# Patient Record
Sex: FEMALE | Race: WHITE | NOT HISPANIC OR LATINO
[De-identification: names, ages, dates, MRNs, and addresses within clinical notes are randomized per-mention and may not be internally consistent; named-entity substitution may affect disease eponyms.]

---

## 2017-02-16 ENCOUNTER — APPOINTMENT (OUTPATIENT)
Dept: RADIATION ONCOLOGY | Facility: CLINIC | Age: 62
End: 2017-02-16
Payer: MEDICARE

## 2017-02-16 VITALS
OXYGEN SATURATION: 99 % | HEART RATE: 94 BPM | RESPIRATION RATE: 18 BRPM | DIASTOLIC BLOOD PRESSURE: 79 MMHG | BODY MASS INDEX: 23.88 KG/M2 | SYSTOLIC BLOOD PRESSURE: 126 MMHG | WEIGHT: 152.45 LBS

## 2017-02-16 PROCEDURE — 99213 OFFICE O/P EST LOW 20 MIN: CPT | Mod: GC

## 2017-04-26 ENCOUNTER — RECORD ABSTRACTING (OUTPATIENT)
Age: 62
End: 2017-04-26

## 2017-04-27 ENCOUNTER — APPOINTMENT (OUTPATIENT)
Dept: GYNECOLOGIC ONCOLOGY | Facility: CLINIC | Age: 62
End: 2017-04-27

## 2017-04-27 ENCOUNTER — OTHER (OUTPATIENT)
Age: 62
End: 2017-04-27

## 2017-04-27 VITALS
HEIGHT: 67 IN | SYSTOLIC BLOOD PRESSURE: 122 MMHG | BODY MASS INDEX: 23.86 KG/M2 | DIASTOLIC BLOOD PRESSURE: 80 MMHG | WEIGHT: 152 LBS

## 2017-04-28 ENCOUNTER — RX RENEWAL (OUTPATIENT)
Age: 62
End: 2017-04-28

## 2017-05-02 ENCOUNTER — APPOINTMENT (OUTPATIENT)
Dept: CT IMAGING | Facility: IMAGING CENTER | Age: 62
End: 2017-05-02

## 2017-05-02 ENCOUNTER — OUTPATIENT (OUTPATIENT)
Dept: OUTPATIENT SERVICES | Facility: HOSPITAL | Age: 62
LOS: 1 days | End: 2017-05-02
Payer: MEDICARE

## 2017-05-02 ENCOUNTER — OTHER (OUTPATIENT)
Age: 62
End: 2017-05-02

## 2017-05-02 DIAGNOSIS — R10.30 LOWER ABDOMINAL PAIN, UNSPECIFIED: ICD-10-CM

## 2017-05-02 LAB — CYTOLOGY CVX/VAG DOC THIN PREP: NORMAL

## 2017-05-02 PROCEDURE — 82565 ASSAY OF CREATININE: CPT

## 2017-05-02 PROCEDURE — 74177 CT ABD & PELVIS W/CONTRAST: CPT

## 2017-05-05 ENCOUNTER — OTHER (OUTPATIENT)
Age: 62
End: 2017-05-05

## 2017-05-08 ENCOUNTER — APPOINTMENT (OUTPATIENT)
Dept: INTERNAL MEDICINE | Facility: CLINIC | Age: 62
End: 2017-05-08

## 2017-05-08 VITALS — SYSTOLIC BLOOD PRESSURE: 130 MMHG | DIASTOLIC BLOOD PRESSURE: 80 MMHG

## 2017-05-08 DIAGNOSIS — J06.9 ACUTE UPPER RESPIRATORY INFECTION, UNSPECIFIED: ICD-10-CM

## 2017-05-08 RX ORDER — FLUOXETINE HYDROCHLORIDE 10 MG/1
10 TABLET ORAL DAILY
Refills: 0 | Status: ACTIVE | COMMUNITY
Start: 2017-05-08

## 2017-05-09 LAB
ALBUMIN SERPL ELPH-MCNC: 4.5 G/DL
ALP BLD-CCNC: 83 U/L
ALT SERPL-CCNC: 11 U/L
ANION GAP SERPL CALC-SCNC: 24 MMOL/L
AST SERPL-CCNC: 15 U/L
BILIRUB SERPL-MCNC: 0.4 MG/DL
BUN SERPL-MCNC: 16 MG/DL
CALCIUM SERPL-MCNC: 10.2 MG/DL
CHLORIDE SERPL-SCNC: 95 MMOL/L
CO2 SERPL-SCNC: 18 MMOL/L
CREAT SERPL-MCNC: 0.79 MG/DL
GLUCOSE SERPL-MCNC: 82 MG/DL
POTASSIUM SERPL-SCNC: 4.5 MMOL/L
PROT SERPL-MCNC: 7.8 G/DL
SODIUM SERPL-SCNC: 137 MMOL/L

## 2017-05-15 ENCOUNTER — RX RENEWAL (OUTPATIENT)
Age: 62
End: 2017-05-15

## 2017-05-15 LAB
BASOPHILS # BLD AUTO: 0 K/UL
BASOPHILS NFR BLD AUTO: 0 %
EOSINOPHIL # BLD AUTO: 0 K/UL
EOSINOPHIL NFR BLD AUTO: 0 %
HCT VFR BLD CALC: 40.3 %
HGB BLD-MCNC: 13.3 G/DL
LYMPHOCYTES # BLD AUTO: 0.43 K/UL
LYMPHOCYTES NFR BLD AUTO: 4.4 %
MAN DIFF?: NORMAL
MCHC RBC-ENTMCNC: 28.7 PG
MCHC RBC-ENTMCNC: 33 GM/DL
MCV RBC AUTO: 87 FL
MONOCYTES # BLD AUTO: 0.86 K/UL
MONOCYTES NFR BLD AUTO: 8.8 %
NEUTROPHILS # BLD AUTO: 8.49 K/UL
NEUTROPHILS NFR BLD AUTO: 86.8 %
PLATELET # BLD AUTO: 503 K/UL
RBC # BLD: 4.63 M/UL
RBC # FLD: 13.3 %
WBC # FLD AUTO: 9.78 K/UL

## 2017-05-19 ENCOUNTER — RX RENEWAL (OUTPATIENT)
Age: 62
End: 2017-05-19

## 2017-05-26 ENCOUNTER — APPOINTMENT (OUTPATIENT)
Dept: INTERNAL MEDICINE | Facility: CLINIC | Age: 62
End: 2017-05-26

## 2017-05-26 VITALS — DIASTOLIC BLOOD PRESSURE: 80 MMHG | SYSTOLIC BLOOD PRESSURE: 120 MMHG

## 2017-06-23 ENCOUNTER — APPOINTMENT (OUTPATIENT)
Dept: INTERNAL MEDICINE | Facility: CLINIC | Age: 62
End: 2017-06-23

## 2017-06-23 VITALS — DIASTOLIC BLOOD PRESSURE: 80 MMHG | SYSTOLIC BLOOD PRESSURE: 140 MMHG

## 2017-08-04 ENCOUNTER — LABORATORY RESULT (OUTPATIENT)
Age: 62
End: 2017-08-04

## 2017-08-04 ENCOUNTER — NON-APPOINTMENT (OUTPATIENT)
Age: 62
End: 2017-08-04

## 2017-08-04 ENCOUNTER — APPOINTMENT (OUTPATIENT)
Dept: INTERNAL MEDICINE | Facility: CLINIC | Age: 62
End: 2017-08-04
Payer: MEDICARE

## 2017-08-04 VITALS — BODY MASS INDEX: 24.17 KG/M2 | WEIGHT: 154 LBS | HEIGHT: 67 IN

## 2017-08-04 VITALS — DIASTOLIC BLOOD PRESSURE: 80 MMHG | SYSTOLIC BLOOD PRESSURE: 120 MMHG

## 2017-08-04 PROCEDURE — 93000 ELECTROCARDIOGRAM COMPLETE: CPT

## 2017-08-04 PROCEDURE — 99214 OFFICE O/P EST MOD 30 MIN: CPT | Mod: 25

## 2017-08-04 PROCEDURE — 36415 COLL VENOUS BLD VENIPUNCTURE: CPT

## 2017-08-04 RX ORDER — CYCLOBENZAPRINE HYDROCHLORIDE 5 MG/1
5 TABLET, FILM COATED ORAL
Qty: 14 | Refills: 0 | Status: DISCONTINUED | COMMUNITY
Start: 2017-05-08 | End: 2017-08-04

## 2017-08-07 ENCOUNTER — RESULT REVIEW (OUTPATIENT)
Age: 62
End: 2017-08-07

## 2017-08-07 LAB
ALBUMIN SERPL ELPH-MCNC: 4.2 G/DL
ALP BLD-CCNC: 106 U/L
ALT SERPL-CCNC: 17 U/L
ANION GAP SERPL CALC-SCNC: 16 MMOL/L
AST SERPL-CCNC: 23 U/L
BASOPHILS # BLD AUTO: 0.02 K/UL
BASOPHILS NFR BLD AUTO: 0.3 %
BILIRUB SERPL-MCNC: 0.3 MG/DL
BUN SERPL-MCNC: 14 MG/DL
CALCIUM SERPL-MCNC: 10.1 MG/DL
CHLORIDE SERPL-SCNC: 100 MMOL/L
CO2 SERPL-SCNC: 21 MMOL/L
CREAT SERPL-MCNC: 0.71 MG/DL
EOSINOPHIL # BLD AUTO: 0.02 K/UL
EOSINOPHIL NFR BLD AUTO: 0.3 %
FOLATE SERPL-MCNC: 17.5 NG/ML
GLUCOSE SERPL-MCNC: 92 MG/DL
HBA1C MFR BLD HPLC: 5.2 %
HCT VFR BLD CALC: 40.2 %
HGB BLD-MCNC: 12.7 G/DL
IMM GRANULOCYTES NFR BLD AUTO: 0.1 %
LYMPHOCYTES # BLD AUTO: 0.41 K/UL
LYMPHOCYTES NFR BLD AUTO: 5.3 %
MAN DIFF?: NORMAL
MCHC RBC-ENTMCNC: 27.9 PG
MCHC RBC-ENTMCNC: 31.6 GM/DL
MCV RBC AUTO: 88.4 FL
MONOCYTES # BLD AUTO: 0.65 K/UL
MONOCYTES NFR BLD AUTO: 8.4 %
NEUTROPHILS # BLD AUTO: 6.65 K/UL
NEUTROPHILS NFR BLD AUTO: 85.6 %
PLATELET # BLD AUTO: 408 K/UL
POTASSIUM SERPL-SCNC: 4.7 MMOL/L
PROT SERPL-MCNC: 7.2 G/DL
RBC # BLD: 4.55 M/UL
RBC # FLD: 14.6 %
SODIUM SERPL-SCNC: 137 MMOL/L
TSH SERPL-ACNC: 2.51 UIU/ML
VIT B12 SERPL-MCNC: 296 PG/ML
WBC # FLD AUTO: 7.76 K/UL

## 2017-08-14 LAB
HOMOCYSTEINE LEVEL: 14.9 UMOL/L
METHYLMALONIC ACID LEVEL: 256 NMOL/L

## 2017-08-14 RX ORDER — CHLORHEXIDINE GLUCONATE 4 %
1000 LIQUID (ML) TOPICAL DAILY
Qty: 30 | Refills: 10 | Status: ACTIVE | COMMUNITY
Start: 2017-08-14

## 2017-08-17 ENCOUNTER — APPOINTMENT (OUTPATIENT)
Dept: RADIATION ONCOLOGY | Facility: CLINIC | Age: 62
End: 2017-08-17
Payer: MEDICARE

## 2017-08-17 VITALS
HEART RATE: 76 BPM | DIASTOLIC BLOOD PRESSURE: 90 MMHG | SYSTOLIC BLOOD PRESSURE: 142 MMHG | OXYGEN SATURATION: 96 % | HEIGHT: 67 IN | WEIGHT: 147.71 LBS | BODY MASS INDEX: 23.18 KG/M2 | RESPIRATION RATE: 18 BRPM

## 2017-08-17 PROCEDURE — 99214 OFFICE O/P EST MOD 30 MIN: CPT | Mod: GC

## 2017-08-25 ENCOUNTER — APPOINTMENT (OUTPATIENT)
Dept: INTERNAL MEDICINE | Facility: CLINIC | Age: 62
End: 2017-08-25
Payer: MEDICARE

## 2017-08-25 DIAGNOSIS — Z01.818 ENCOUNTER FOR OTHER PREPROCEDURAL EXAMINATION: ICD-10-CM

## 2017-08-25 PROCEDURE — 99214 OFFICE O/P EST MOD 30 MIN: CPT

## 2017-08-26 ENCOUNTER — APPOINTMENT (OUTPATIENT)
Dept: INTERNAL MEDICINE | Facility: CLINIC | Age: 62
End: 2017-08-26
Payer: MEDICARE

## 2017-08-26 PROCEDURE — 93224 XTRNL ECG REC UP TO 48 HRS: CPT

## 2017-08-29 VITALS — DIASTOLIC BLOOD PRESSURE: 80 MMHG | SYSTOLIC BLOOD PRESSURE: 120 MMHG

## 2017-09-08 ENCOUNTER — APPOINTMENT (OUTPATIENT)
Dept: NEUROLOGY | Facility: CLINIC | Age: 62
End: 2017-09-08
Payer: MEDICARE

## 2017-09-08 VITALS
SYSTOLIC BLOOD PRESSURE: 146 MMHG | HEART RATE: 115 BPM | BODY MASS INDEX: 23.07 KG/M2 | HEIGHT: 67 IN | DIASTOLIC BLOOD PRESSURE: 82 MMHG | WEIGHT: 147 LBS

## 2017-09-08 PROCEDURE — 99204 OFFICE O/P NEW MOD 45 MIN: CPT

## 2017-09-17 ENCOUNTER — FORM ENCOUNTER (OUTPATIENT)
Age: 62
End: 2017-09-17

## 2017-09-18 ENCOUNTER — OUTPATIENT (OUTPATIENT)
Dept: OUTPATIENT SERVICES | Facility: HOSPITAL | Age: 62
LOS: 1 days | End: 2017-09-18
Payer: MEDICARE

## 2017-09-18 ENCOUNTER — APPOINTMENT (OUTPATIENT)
Dept: MRI IMAGING | Facility: CLINIC | Age: 62
End: 2017-09-18
Payer: MEDICARE

## 2017-09-18 DIAGNOSIS — Z00.8 ENCOUNTER FOR OTHER GENERAL EXAMINATION: ICD-10-CM

## 2017-09-18 PROCEDURE — 72148 MRI LUMBAR SPINE W/O DYE: CPT | Mod: 26

## 2017-09-18 PROCEDURE — 72148 MRI LUMBAR SPINE W/O DYE: CPT

## 2017-09-20 ENCOUNTER — MEDICATION RENEWAL (OUTPATIENT)
Age: 62
End: 2017-09-20

## 2017-10-11 ENCOUNTER — APPOINTMENT (OUTPATIENT)
Dept: INTERNAL MEDICINE | Facility: CLINIC | Age: 62
End: 2017-10-11
Payer: MEDICARE

## 2017-10-11 VITALS — SYSTOLIC BLOOD PRESSURE: 120 MMHG | DIASTOLIC BLOOD PRESSURE: 80 MMHG

## 2017-10-11 DIAGNOSIS — Z98.890 OTHER SPECIFIED POSTPROCEDURAL STATES: ICD-10-CM

## 2017-10-11 PROCEDURE — G0008: CPT

## 2017-10-11 PROCEDURE — 99214 OFFICE O/P EST MOD 30 MIN: CPT | Mod: 25

## 2017-10-11 PROCEDURE — 90686 IIV4 VACC NO PRSV 0.5 ML IM: CPT

## 2017-10-11 RX ORDER — MELOXICAM 15 MG/1
15 TABLET ORAL DAILY
Qty: 30 | Refills: 2 | Status: DISCONTINUED | COMMUNITY
Start: 2017-09-08 | End: 2017-10-11

## 2017-10-11 RX ORDER — NAPROXEN 500 MG/1
500 TABLET ORAL TWICE DAILY
Qty: 20 | Refills: 0 | Status: DISCONTINUED | COMMUNITY
Start: 2017-05-08 | End: 2017-10-11

## 2017-10-16 ENCOUNTER — INPATIENT (INPATIENT)
Facility: HOSPITAL | Age: 62
LOS: 6 days | Discharge: HOME | DRG: 470 | End: 2017-10-23
Attending: ORTHOPAEDIC SURGERY | Admitting: ORTHOPAEDIC SURGERY
Payer: MEDICARE

## 2017-10-16 VITALS
TEMPERATURE: 98 F | RESPIRATION RATE: 18 BRPM | OXYGEN SATURATION: 98 % | SYSTOLIC BLOOD PRESSURE: 134 MMHG | HEART RATE: 88 BPM | WEIGHT: 143.08 LBS | DIASTOLIC BLOOD PRESSURE: 79 MMHG

## 2017-10-16 DIAGNOSIS — K21.9 GASTRO-ESOPHAGEAL REFLUX DISEASE WITHOUT ESOPHAGITIS: ICD-10-CM

## 2017-10-16 DIAGNOSIS — K57.92 DIVERTICULITIS OF INTESTINE, PART UNSPECIFIED, WITHOUT PERFORATION OR ABSCESS WITHOUT BLEEDING: ICD-10-CM

## 2017-10-16 DIAGNOSIS — S72.001A FRACTURE OF UNSPECIFIED PART OF NECK OF RIGHT FEMUR, INITIAL ENCOUNTER FOR CLOSED FRACTURE: ICD-10-CM

## 2017-10-16 DIAGNOSIS — C55 MALIGNANT NEOPLASM OF UTERUS, PART UNSPECIFIED: ICD-10-CM

## 2017-10-16 DIAGNOSIS — F41.8 OTHER SPECIFIED ANXIETY DISORDERS: ICD-10-CM

## 2017-10-16 LAB
ALBUMIN SERPL ELPH-MCNC: 4.2 G/DL — SIGNIFICANT CHANGE UP (ref 3.3–5)
ALP SERPL-CCNC: 89 U/L — SIGNIFICANT CHANGE UP (ref 40–120)
ALT FLD-CCNC: 11 U/L RC — SIGNIFICANT CHANGE UP (ref 10–45)
ANION GAP SERPL CALC-SCNC: 15 MMOL/L — SIGNIFICANT CHANGE UP (ref 5–17)
APPEARANCE UR: CLEAR — SIGNIFICANT CHANGE UP
APTT BLD: 28.3 SEC — SIGNIFICANT CHANGE UP (ref 27.5–37.4)
AST SERPL-CCNC: 15 U/L — SIGNIFICANT CHANGE UP (ref 10–40)
BASOPHILS # BLD AUTO: 0 K/UL — SIGNIFICANT CHANGE UP (ref 0–0.2)
BASOPHILS NFR BLD AUTO: 0.5 % — SIGNIFICANT CHANGE UP (ref 0–2)
BILIRUB SERPL-MCNC: 0.5 MG/DL — SIGNIFICANT CHANGE UP (ref 0.2–1.2)
BILIRUB UR-MCNC: NEGATIVE — SIGNIFICANT CHANGE UP
BLD GP AB SCN SERPL QL: NEGATIVE — SIGNIFICANT CHANGE UP
BUN SERPL-MCNC: 15 MG/DL — SIGNIFICANT CHANGE UP (ref 7–23)
CALCIUM SERPL-MCNC: 9.8 MG/DL — SIGNIFICANT CHANGE UP (ref 8.4–10.5)
CHLORIDE SERPL-SCNC: 97 MMOL/L — SIGNIFICANT CHANGE UP (ref 96–108)
CO2 SERPL-SCNC: 24 MMOL/L — SIGNIFICANT CHANGE UP (ref 22–31)
COLOR SPEC: SIGNIFICANT CHANGE UP
CREAT SERPL-MCNC: 0.75 MG/DL — SIGNIFICANT CHANGE UP (ref 0.5–1.3)
CRP SERPL-MCNC: 0.6 MG/DL — HIGH (ref 0–0.4)
DIFF PNL FLD: NEGATIVE — SIGNIFICANT CHANGE UP
EOSINOPHIL # BLD AUTO: 0 K/UL — SIGNIFICANT CHANGE UP (ref 0–0.5)
EOSINOPHIL NFR BLD AUTO: 0.3 % — SIGNIFICANT CHANGE UP (ref 0–6)
ERYTHROCYTE [SEDIMENTATION RATE] IN BLOOD: 19 MM/HR — SIGNIFICANT CHANGE UP (ref 0–20)
GLUCOSE SERPL-MCNC: 104 MG/DL — HIGH (ref 70–99)
GLUCOSE UR QL: NEGATIVE — SIGNIFICANT CHANGE UP
HCT VFR BLD CALC: 38.2 % — SIGNIFICANT CHANGE UP (ref 34.5–45)
HGB BLD-MCNC: 13.3 G/DL — SIGNIFICANT CHANGE UP (ref 11.5–15.5)
INR BLD: 1.01 RATIO — SIGNIFICANT CHANGE UP (ref 0.88–1.16)
KETONES UR-MCNC: ABNORMAL
LDH SERPL L TO P-CCNC: 200 U/L — SIGNIFICANT CHANGE UP (ref 50–242)
LEUKOCYTE ESTERASE UR-ACNC: NEGATIVE — SIGNIFICANT CHANGE UP
LYMPHOCYTES # BLD AUTO: 0.6 K/UL — LOW (ref 1–3.3)
LYMPHOCYTES # BLD AUTO: 6.4 % — LOW (ref 13–44)
MCHC RBC-ENTMCNC: 30.8 PG — SIGNIFICANT CHANGE UP (ref 27–34)
MCHC RBC-ENTMCNC: 34.8 GM/DL — SIGNIFICANT CHANGE UP (ref 32–36)
MCV RBC AUTO: 88.3 FL — SIGNIFICANT CHANGE UP (ref 80–100)
MONOCYTES # BLD AUTO: 0.7 K/UL — SIGNIFICANT CHANGE UP (ref 0–0.9)
MONOCYTES NFR BLD AUTO: 8.3 % — SIGNIFICANT CHANGE UP (ref 2–14)
NEUTROPHILS # BLD AUTO: 7.6 K/UL — HIGH (ref 1.8–7.4)
NEUTROPHILS NFR BLD AUTO: 84.5 % — HIGH (ref 43–77)
NITRITE UR-MCNC: NEGATIVE — SIGNIFICANT CHANGE UP
PH UR: 7 — SIGNIFICANT CHANGE UP (ref 5–8)
PLATELET # BLD AUTO: 384 K/UL — SIGNIFICANT CHANGE UP (ref 150–400)
POTASSIUM SERPL-MCNC: 3.9 MMOL/L — SIGNIFICANT CHANGE UP (ref 3.5–5.3)
POTASSIUM SERPL-SCNC: 3.9 MMOL/L — SIGNIFICANT CHANGE UP (ref 3.5–5.3)
PROT SERPL-MCNC: 6.4 G/DL — SIGNIFICANT CHANGE UP (ref 6–8.3)
PROT SERPL-MCNC: 6.4 G/DL — SIGNIFICANT CHANGE UP (ref 6–8.3)
PROT SERPL-MCNC: 6.8 G/DL — SIGNIFICANT CHANGE UP (ref 6–8.3)
PROT UR-MCNC: NEGATIVE — SIGNIFICANT CHANGE UP
PROTHROM AB SERPL-ACNC: 11 SEC — SIGNIFICANT CHANGE UP (ref 9.8–12.7)
RBC # BLD: 4.33 M/UL — SIGNIFICANT CHANGE UP (ref 3.8–5.2)
RBC # FLD: 12.2 % — SIGNIFICANT CHANGE UP (ref 10.3–14.5)
RH IG SCN BLD-IMP: NEGATIVE — SIGNIFICANT CHANGE UP
SODIUM SERPL-SCNC: 136 MMOL/L — SIGNIFICANT CHANGE UP (ref 135–145)
SP GR SPEC: 1.01 — LOW (ref 1.01–1.02)
UROBILINOGEN FLD QL: NEGATIVE — SIGNIFICANT CHANGE UP
WBC # BLD: 9 K/UL — SIGNIFICANT CHANGE UP (ref 3.8–10.5)
WBC # FLD AUTO: 9 K/UL — SIGNIFICANT CHANGE UP (ref 3.8–10.5)

## 2017-10-16 PROCEDURE — 99285 EMERGENCY DEPT VISIT HI MDM: CPT

## 2017-10-16 PROCEDURE — 93010 ELECTROCARDIOGRAM REPORT: CPT

## 2017-10-16 PROCEDURE — 73552 X-RAY EXAM OF FEMUR 2/>: CPT | Mod: 26,RT

## 2017-10-16 PROCEDURE — 73502 X-RAY EXAM HIP UNI 2-3 VIEWS: CPT | Mod: 26,RT

## 2017-10-16 PROCEDURE — 71010: CPT | Mod: 26

## 2017-10-16 PROCEDURE — 71260 CT THORAX DX C+: CPT | Mod: 26

## 2017-10-16 PROCEDURE — 74177 CT ABD & PELVIS W/CONTRAST: CPT | Mod: 26

## 2017-10-16 PROCEDURE — 72195 MRI PELVIS W/O DYE: CPT | Mod: 26

## 2017-10-16 RX ORDER — GABAPENTIN 400 MG/1
1 CAPSULE ORAL
Qty: 0 | Refills: 0 | COMMUNITY

## 2017-10-16 RX ORDER — ACETAMINOPHEN 500 MG
1000 TABLET ORAL ONCE
Qty: 0 | Refills: 0 | Status: COMPLETED | OUTPATIENT
Start: 2017-10-16 | End: 2017-10-16

## 2017-10-16 RX ORDER — HYDROMORPHONE HYDROCHLORIDE 2 MG/ML
0.5 INJECTION INTRAMUSCULAR; INTRAVENOUS; SUBCUTANEOUS EVERY 4 HOURS
Qty: 0 | Refills: 0 | Status: DISCONTINUED | OUTPATIENT
Start: 2017-10-16 | End: 2017-10-19

## 2017-10-16 RX ORDER — GABAPENTIN 400 MG/1
300 CAPSULE ORAL THREE TIMES A DAY
Qty: 0 | Refills: 0 | Status: DISCONTINUED | OUTPATIENT
Start: 2017-10-16 | End: 2017-10-19

## 2017-10-16 RX ORDER — ACETAMINOPHEN 500 MG
650 TABLET ORAL EVERY 6 HOURS
Qty: 0 | Refills: 0 | Status: DISCONTINUED | OUTPATIENT
Start: 2017-10-16 | End: 2017-10-19

## 2017-10-16 RX ORDER — OXYCODONE HYDROCHLORIDE 5 MG/1
5 TABLET ORAL EVERY 4 HOURS
Qty: 0 | Refills: 0 | Status: DISCONTINUED | OUTPATIENT
Start: 2017-10-16 | End: 2017-10-19

## 2017-10-16 RX ORDER — OXYCODONE HYDROCHLORIDE 5 MG/1
5 TABLET ORAL ONCE
Qty: 0 | Refills: 0 | Status: DISCONTINUED | OUTPATIENT
Start: 2017-10-16 | End: 2017-10-16

## 2017-10-16 RX ORDER — THIOTHIXENE 5 MG
5 CAPSULE ORAL
Qty: 0 | Refills: 0 | Status: DISCONTINUED | OUTPATIENT
Start: 2017-10-16 | End: 2017-10-19

## 2017-10-16 RX ORDER — DIPHENHYDRAMINE HCL 50 MG
25 CAPSULE ORAL AT BEDTIME
Qty: 0 | Refills: 0 | Status: DISCONTINUED | OUTPATIENT
Start: 2017-10-16 | End: 2017-10-19

## 2017-10-16 RX ORDER — CHOLECALCIFEROL (VITAMIN D3) 125 MCG
0 CAPSULE ORAL
Qty: 0 | Refills: 0 | COMMUNITY

## 2017-10-16 RX ORDER — ACETAMINOPHEN 500 MG
325 TABLET ORAL EVERY 4 HOURS
Qty: 0 | Refills: 0 | Status: DISCONTINUED | OUTPATIENT
Start: 2017-10-16 | End: 2017-10-19

## 2017-10-16 RX ORDER — OXYCODONE HYDROCHLORIDE 5 MG/1
10 TABLET ORAL EVERY 4 HOURS
Qty: 0 | Refills: 0 | Status: DISCONTINUED | OUTPATIENT
Start: 2017-10-16 | End: 2017-10-19

## 2017-10-16 RX ORDER — ACETAMINOPHEN 500 MG
975 TABLET ORAL ONCE
Qty: 0 | Refills: 0 | Status: COMPLETED | OUTPATIENT
Start: 2017-10-16 | End: 2017-10-16

## 2017-10-16 RX ORDER — THIOTHIXENE 5 MG
20 CAPSULE ORAL AT BEDTIME
Qty: 0 | Refills: 0 | Status: DISCONTINUED | OUTPATIENT
Start: 2017-10-16 | End: 2017-10-19

## 2017-10-16 RX ORDER — FAMOTIDINE 10 MG/ML
1 INJECTION INTRAVENOUS
Qty: 0 | Refills: 0 | COMMUNITY

## 2017-10-16 RX ORDER — HEPARIN SODIUM 5000 [USP'U]/ML
5000 INJECTION INTRAVENOUS; SUBCUTANEOUS EVERY 8 HOURS
Qty: 0 | Refills: 0 | Status: COMPLETED | OUTPATIENT
Start: 2017-10-16 | End: 2017-10-18

## 2017-10-16 RX ORDER — ASCORBIC ACID 60 MG
0 TABLET,CHEWABLE ORAL
Qty: 0 | Refills: 0 | COMMUNITY

## 2017-10-16 RX ORDER — FAMOTIDINE 10 MG/ML
20 INJECTION INTRAVENOUS EVERY 12 HOURS
Qty: 0 | Refills: 0 | Status: DISCONTINUED | OUTPATIENT
Start: 2017-10-16 | End: 2017-10-19

## 2017-10-16 RX ORDER — FLUOXETINE HCL 10 MG
20 CAPSULE ORAL DAILY
Qty: 0 | Refills: 0 | Status: DISCONTINUED | OUTPATIENT
Start: 2017-10-16 | End: 2017-10-19

## 2017-10-16 RX ORDER — HYDROMORPHONE HYDROCHLORIDE 2 MG/ML
1 INJECTION INTRAMUSCULAR; INTRAVENOUS; SUBCUTANEOUS ONCE
Qty: 0 | Refills: 0 | Status: COMPLETED | OUTPATIENT
Start: 2017-10-16 | End: 2017-10-23

## 2017-10-16 RX ORDER — TRIHEXYPHENIDYL HCL 2 MG
2 TABLET ORAL
Qty: 0 | Refills: 0 | Status: DISCONTINUED | OUTPATIENT
Start: 2017-10-16 | End: 2017-10-19

## 2017-10-16 RX ADMIN — OXYCODONE HYDROCHLORIDE 10 MILLIGRAM(S): 5 TABLET ORAL at 20:21

## 2017-10-16 RX ADMIN — OXYCODONE HYDROCHLORIDE 5 MILLIGRAM(S): 5 TABLET ORAL at 14:30

## 2017-10-16 RX ADMIN — HEPARIN SODIUM 5000 UNIT(S): 5000 INJECTION INTRAVENOUS; SUBCUTANEOUS at 23:13

## 2017-10-16 RX ADMIN — Medication 975 MILLIGRAM(S): at 14:30

## 2017-10-16 RX ADMIN — Medication 2 MILLIGRAM(S): at 23:13

## 2017-10-16 RX ADMIN — OXYCODONE HYDROCHLORIDE 10 MILLIGRAM(S): 5 TABLET ORAL at 20:51

## 2017-10-16 RX ADMIN — Medication 1000 MILLIGRAM(S): at 23:45

## 2017-10-16 RX ADMIN — Medication 400 MILLIGRAM(S): at 23:12

## 2017-10-16 RX ADMIN — Medication 20 MILLIGRAM(S): at 23:18

## 2017-10-16 RX ADMIN — OXYCODONE HYDROCHLORIDE 5 MILLIGRAM(S): 5 TABLET ORAL at 14:43

## 2017-10-16 RX ADMIN — Medication 20 MILLIGRAM(S): at 23:13

## 2017-10-16 RX ADMIN — Medication 975 MILLIGRAM(S): at 14:42

## 2017-10-16 RX ADMIN — GABAPENTIN 300 MILLIGRAM(S): 400 CAPSULE ORAL at 23:13

## 2017-10-16 NOTE — H&P ADULT - ASSESSMENT
A/P: 62y Female with rapid progressive arthrosis of R hip  -FU MRI  -FU ESR/CRP  Pain control  WBAT  FU labs/imaging  dispo to follow complete work up for infection vs avn vs tumor process

## 2017-10-16 NOTE — ED ADULT NURSE NOTE - PMH
Depression with anxiety  Since 1999 controlled on Meds & [place] Visit to Psychiatrist  Diverticulitis    GERD (gastroesophageal reflux disease)    Uterine cancer  11/2014  Radiation  completed  4/2015

## 2017-10-16 NOTE — ED PROVIDER NOTE - NS ED ROS FT
ROS: As noted in HPI, otherwise below --  Constitutional symptoms: Negative  Eyes: Negative  Ears, Nose, Mouth, Throat: Negative  Cardiovascular: Negative  Respiratory: Negative  Gastrointestinal: Negative  Genitourinary: Negative  Musculoskeletal: +  Integumentary: Negative  Neurological: Negative  Psychiatric: Negative  Endocrine: Negative  Allergic/Immunologic: Negative

## 2017-10-16 NOTE — ED PROVIDER NOTE - OBJECTIVE STATEMENT
62 year old female hx below now w R hip pain for the last few days, no acute injury, moderate constant sharp worse w R hip movement. Did have back pain and MRI which we reviewed (9/18). No saddle parasthesia, no urinary incont. 62 year old female hx below now w R hip pain for the last few days, no acute injury, moderate constant sharp worse w R hip movement. Did have back pain and MRI which we reviewed (9/18). No saddle parasthesia, no urinary incont.    Edilson Dennis MD: Also with R hip arthrosis from CT scan on 5/2017 62 year old female hx below now w R hip pain for the last few days, no acute injury, moderate constant sharp worse w R hip movement. Did have back pain and MRI which we reviewed (9/18). No saddle parasthesia, no urinary incont.     Edilson Dennis MD: Also with R hip arthrosis from CT scan on 5/2017. Ambulatory with cane for the past few months. 62 year old female hx below now w R hip pain for the last few days, no acute injury, moderate constant sharp worse w R hip movement. Did have back pain and MRI which we reviewed (9/18). No saddle parasthesia, no urinary incont.     Edilson Dennis MD: [facility] YEIMY/BSO for hx of uterine cancer (11/2014) radiation 4/2015, GERD. Also with R hip arthrosis from CT scan on 5/2017. Ambulatory with cane for the past few months, but with worsening ambulatory status.

## 2017-10-16 NOTE — ED PROVIDER NOTE - ATTENDING CONTRIBUTION TO CARE
Dr. FRANCE Ramirez:  I have independently evaluated the patient and have documented in the appropriate sections above.  I agree with the exam and plan as noted above.

## 2017-10-16 NOTE — H&P ADULT - FAMILY HISTORY
Mother  Still living? Unknown  Type 2 diabetes mellitus, Age at diagnosis: Age Unknown  Family history of hypertension, Age at diagnosis: Age Unknown  Family history of lung cancer, Age at diagnosis: Age Unknown

## 2017-10-16 NOTE — ED ADULT NURSE NOTE - OBJECTIVE STATEMENT
62yr female presented to ED c/o R leg and high pain.  Pt had MRI and diagnosed with sciatica 1 week ago, complaining of pain and inability to walk without cane due to the pain. Pt denies trauma, no recent falls, no obvious deformities or lacerations noted, peripheral pulse present, skin warm dry & intact, a&ox4. 62yr female presented to ED c/o R leg and high pain.  Pt has hx of uterine CA. Pt had MRI and diagnosed with sciatica 1 week ago, complaining of pain and inability to walk without cane due to the pain. Pt denies trauma, no recent falls, no obvious deformities or lacerations noted, peripheral pulse present, skin warm dry & intact, a&ox4.

## 2017-10-16 NOTE — H&P ADULT - HISTORY OF PRESENT ILLNESS
62y Female community ambulator w/o assistive devices presents c/o R hip pain and inability to ambulate. The patient has had increasing atraumatic hip pain for 5 months. 3 months ago she started using a can to ambulate. She is now unable to ambulate because of pain.  Denies numbness/tingling. Denies fever/chills. Denies pain/injury elsewhere. Denies trauma. History of uterine cancer sp TAHBSH and radiation Nov 2014 and 4/2015 respectively. denies smoking history, alcohol history, history of steroid use.    PAST MEDICAL & SURGICAL HISTORY:  GERD (gastroesophageal reflux disease)  Diverticulitis  Uterine cancer: 11/2014  Radiation  completed  4/2015  Depression with anxiety: Since 1999 controlled on Meds &amp; [place] Visit to Psychiatrist  S/P hernia repair: 1/2015  S/P YEIMY-BSO (total abdominal hysterectomy and bilateral salpingo-oophorectomy): 11/2014  Status post bunionectomy: Bilateral 1982  S/P tonsillectomy    MEDICATIONS  (STANDING):  famotidine    Tablet 20 milliGRAM(s) Oral every 12 hours  heparin  Injectable 5000 Unit(s) SubCutaneous every 8 hours    Allergies    chocolate (Rash)  No Known Drug Allergies    Intolerances

## 2017-10-16 NOTE — H&P ADULT - NSHPLABSRESULTS_GEN_ALL_CORE
13.3   9.0   )-----------( 384      ( 16 Oct 2017 15:51 )             38.2     16 Oct 2017 15:51    136    |  97     |  15     ----------------------------<  104    3.9     |  24     |  0.75     Ca    9.8        16 Oct 2017 15:51    TPro  6.8    /  Alb  4.2    /  TBili  0.5    /  DBili  x      /  AST  15     /  ALT  11     /  AlkPhos  89     16 Oct 2017 15:51    PT/INR - ( 16 Oct 2017 15:51 )   PT: 11.0 sec;   INR: 1.01 ratio         PTT - ( 16 Oct 2017 15:51 )  PTT:28.3 sec  Vital Signs Last 24 Hrs  T(C): 36.4 (10-16-17 @ 16:05), Max: 36.4 (10-16-17 @ 13:44)  T(F): 97.6 (10-16-17 @ 16:05), Max: 97.6 (10-16-17 @ 16:05)  HR: 86 (10-16-17 @ 16:05) (86 - 88)  BP: 147/81 (10-16-17 @ 16:05) (134/79 - 147/81)  BP(mean): --  RR: 17 (10-16-17 @ 16:05) (17 - 18)  SpO2: 100% (10-16-17 @ 16:05) (98% - 100%)    Imaging: XR were personally reviewed and demonstrates  XR  No acute fracture.    Redemonstrated complete collapse/resorption of the right femoral head with bony   debris throughout the right hip joint.      CT  BONES:  Destruction  of  right  femoral  head  and  mixed  lytic  and  sclerotic  changes  of  the  adjacent  right  acetabulum  with  superior  lateral  subluxation  of  the  femoral  head.  Healed  fractures  of  several  anterior  left  ribs.  Moderate  degenerative  changes  of  the  lumbar  spine.    IMPRESSION:  1.    Destruction  of  the  right  femoral  head  with  associated  changes  of  the  right  acetabulum.  This  finding  will  be  better  evaluated  on  pending  MRI.  2.    No  other  evidence  of  metastatic  disease.  3.    Heterogeneous  enhancement  of  the  right  femoral  vein.  Deep  venous  thrombus  is  considered. 13.3   9.0   )-----------( 384      ( 16 Oct 2017 15:51 )             38.2     16 Oct 2017 15:51    136    |  97     |  15     ----------------------------<  104    3.9     |  24     |  0.75     Ca    9.8        16 Oct 2017 15:51    TPro  6.8    /  Alb  4.2    /  TBili  0.5    /  DBili  x      /  AST  15     /  ALT  11     /  AlkPhos  89     16 Oct 2017 15:51    PT/INR - ( 16 Oct 2017 15:51 )   PT: 11.0 sec;   INR: 1.01 ratio         PTT - ( 16 Oct 2017 15:51 )  PTT:28.3 sec  Vital Signs Last 24 Hrs  T(C): 36.4 (10-16-17 @ 16:05), Max: 36.4 (10-16-17 @ 13:44)  T(F): 97.6 (10-16-17 @ 16:05), Max: 97.6 (10-16-17 @ 16:05)  HR: 86 (10-16-17 @ 16:05) (86 - 88)  BP: 147/81 (10-16-17 @ 16:05) (134/79 - 147/81)  BP(mean): --  RR: 17 (10-16-17 @ 16:05) (17 - 18)  SpO2: 100% (10-16-17 @ 16:05) (98% - 100%)    Imaging: XR were personally reviewed and demonstrates  XR  Impression:    There  is  complete  collapse/resorption  of  the  right  femoral  head  with  bony  debris  throughout  the  right  hip  joint  and  severe  right  hip  osteoarthritis  with  a  resorbed  femoral  head  articulating  with  the  superolateral  aspect  of  the  right  acetabulum.  These  findings  are  new  from  the  prior  CT  of  5/12/2017  and  are  consistent  with  rapidly  progressive  coxa  arthropathy.    Degenerative  changes  are  seen  throughout  the  lumbar  spine.    There  are  multiple  surgical  clips  that  are  noted  throughout  the  pelvis  and  inferior  abdomen.    Enthesophytes  are  seen  in  the  bilateral  greater  trochanter.      CT  BONES:  Destruction  of  right  femoral  head  and  mixed  lytic  and  sclerotic  changes  of  the  adjacent  right  acetabulum  with  superior  lateral  subluxation  of  the  femoral  head.  Healed  fractures  of  several  anterior  left  ribs.  Moderate  degenerative  changes  of  the  lumbar  spine.    IMPRESSION:  1.    Destruction  of  the  right  femoral  head  with  associated  changes  of  the  right  acetabulum.  This  finding  will  be  better  evaluated  on  pending  MRI.  2.    No  other  evidence  of  metastatic  disease.  3.    Heterogeneous  enhancement  of  the  right  femoral  vein.  Deep  venous  thrombus  is  considered.

## 2017-10-16 NOTE — ED PROVIDER NOTE - CARE PLAN
Principal Discharge DX:	Closed fracture of right hip, initial encounter  Goal:	w destruction, not acute

## 2017-10-16 NOTE — ED ADULT NURSE NOTE - PSH
S/P hernia repair  1/2015  S/P YEIMY-BSO (total abdominal hysterectomy and bilateral salpingo-oophorectomy)  11/2014  S/P tonsillectomy    Status post bunionectomy  Bilateral 1982

## 2017-10-16 NOTE — CONSULT NOTE ADULT - SUBJECTIVE AND OBJECTIVE BOX
62y Female community ambulator w/o assistive devices presents c/o R hip pain and inability to ambulate. The patient has had increasing atraumatic hip pain for 5 months. 3 months ago she started using a can to ambulate. She is now unable to ambulate because of pain.  Denies numbness/tingling. Denies fever/chills. Denies pain/injury elsewhere. Denies trauma. History of uterine cancer sp TAHBSH and radiation 2014 and 2015 respectively. denies smoking history, alcohol history, history of steroid use. Patient scheduled for surgical evaluation of HIP    Patient is a 62y old  Female who presents with a chief complaint of inability to ambulate (16 Oct 2017 18:39)      PAST MEDICAL & SURGICAL HISTORY:  GERD (gastroesophageal reflux disease)  Diverticulitis  Uterine cancer: 2014  Radiation  completed  2015  Depression with anxiety: Since  controlled on Meds &amp; [place] Visit to Psychiatrist  S/P hernia repair: 2015  S/P YEIMY-BSO (total abdominal hysterectomy and bilateral salpingo-oophorectomy): 2014  Status post bunionectomy: Bilateral   S/P tonsillectomy    allergies: chocolate    MEDICATIONS  (STANDING):  famotidine    Tablet 20 milliGRAM(s) Oral every 12 hours  FLUoxetine 20 milliGRAM(s) Oral daily  gabapentin 300 milliGRAM(s) Oral three times a day  heparin  Injectable 5000 Unit(s) SubCutaneous every 8 hours  thiothixene 5 milliGRAM(s) Oral <User Schedule>  thiothixene 20 milliGRAM(s) Oral at bedtime  trihexyphenidyl 2 milliGRAM(s) Oral two times a day    MEDICATIONS  (PRN):  acetaminophen   Tablet 650 milliGRAM(s) Oral every 6 hours PRN For Temp greater than 38 C (100.4 F)  acetaminophen   Tablet. 325 milliGRAM(s) Oral every 4 hours PRN Mild Pain (1 - 3)  diphenhydrAMINE   Capsule 25 milliGRAM(s) Oral at bedtime PRN Insomnia  HYDROmorphone  Injectable 0.5 milliGRAM(s) IV Push every 4 hours PRN breakthrough  oxyCODONE    IR 10 milliGRAM(s) Oral every 4 hours PRN Severe Pain (7 - 10)  oxyCODONE    IR 5 milliGRAM(s) Oral every 4 hours PRN Moderate Pain (4 - 6)    Soc HX:  Tobacco: Neg  ETOH: Neg  Drugs: neg    Family Hx:  as per my conversation with the patient, non contributory    CONSTITUTIONAL: No weakness, fevers or chills  EYES/ENT: No visual changes;  No vertigo or throat pain   NECK: No pain or stiffness  RESPIRATORY: No cough, wheezing, hemoptysis; No shortness of breath  CARDIOVASCULAR: No chest pain or palpitations  GASTROINTESTINAL: No abdominal or epigastric pain. No nausea, vomiting, or hematemesis; No diarrhea or constipation. No melena or hematochezia.  GENITOURINARY: No dysuria, frequency or hematuria  NEUROLOGICAL: No numbness or weakness  SKIN: No itching, burning, rashes, or lesions   MUSCULOSKELETAL: right hip pain    INTERVAL HPI/OVERNIGHT EVENTS:  T(C): 36.6 (10-16-17 @ 21:50), Max: 36.6 (10-16-17 @ 18:55)  HR: 79 (10-16-17 @ 21:50) (79 - 88)  BP: 128/82 (10-16-17 @ 21:50) (128/82 - 147/81)  RR: 16 (10-16-17 @ 21:50) (16 - 18)  SpO2: 95% (10-16-17 @ 21:50) (95% - 100%)  Wt(kg): --  I&O's Summary    16 Oct 2017 07:01  -  16 Oct 2017 22:53  --------------------------------------------------------  IN: 280 mL / OUT: 0 mL / NET: 280 mL        PHYSICAL EXAM:  GENERAL: NAD, well-groomed, well-developed  HEAD:  Atraumatic, Normocephalic  EYES: EOMI, PERRLA, conjunctiva and sclera clear  ENMT: No tonsillar erythema, exudates, or enlargement; Moist mucous membranes, Good dentition, No lesions  NECK: Supple, No JVD, Normal thyroid  NERVOUS SYSTEM:  Alert & Oriented X3, Good concentration; Motor Strength 5/5 B/L upper and lower extremities; weakness in RLE due to pain  CHEST/LUNG: Clear to percussion bilaterally; No rales, rhonchi, wheezing, or rubs  HEART: Regular rate and rhythm; No murmurs, rubs, or gallops  ABDOMEN: Soft, Nontender, Nondistended; Bowel sounds present  EXTREMITIES:  2+ Peripheral Pulses, No clubbing, cyanosis, or edema  LYMPH: No lymphadenopathy noted  SKIN: No rashes or lesions        LABS:                        13.3   9.0   )-----------( 384      ( 16 Oct 2017 15:51 )             38.2     10-    136  |  97  |  15  ----------------------------<  104<H>  3.9   |  24  |  0.75    Ca    9.8      16 Oct 2017 15:51    TPro  6.4  /  Alb  x   /  TBili  x   /  DBili  x   /  AST  x   /  ALT  x   /  AlkPhos  x   10-16    PT/INR - ( 16 Oct 2017 15:51 )   PT: 11.0 sec;   INR: 1.01 ratio         PTT - ( 16 Oct 2017 15:51 )  PTT:28.3 sec  Urinalysis Basic - ( 16 Oct 2017 16:10 )    Color: PL Yellow / Appearance: Clear / S.007 / pH: x  Gluc: x / Ketone: Moderate  / Bili: Negative / Urobili: Negative   Blood: x / Protein: Negative / Nitrite: Negative   Leuk Esterase: Negative / RBC: x / WBC x   Sq Epi: x / Non Sq Epi: x / Bacteria: x      CAPILLARY BLOOD GLUCOSE      EKG:  pending      Urinalysis Basic - ( 16 Oct 2017 16:10 )    Color: PL Yellow / Appearance: Clear / S.007 / pH: x  Gluc: x / Ketone: Moderate  / Bili: Negative / Urobili: Negative   Blood: x / Protein: Negative / Nitrite: Negative   Leuk Esterase: Negative / RBC: x / WBC x   Sq Epi: x / Non Sq Epi: x / Bacteria: x        Radiology reports:

## 2017-10-16 NOTE — CONSULT NOTE ADULT - ASSESSMENT
61 yo woman present with a hx of atraumatic hip pain. scheduled for surgical evaluation and treatent to r/o infection vs neoplasm vs AVN

## 2017-10-16 NOTE — CONSULT NOTE ADULT - PROBLEM SELECTOR RECOMMENDATION 9
awaiting EKG  if unremarkable then no contraindication to scheduled procedure  pin meds as needed  DVT and GI prophylaxis

## 2017-10-16 NOTE — ED PROVIDER NOTE - MUSCULOSKELETAL, MLM
Spine appears normal, range of motion is not limited, no muscle or joint tenderness. no spinal ttp, mild R lateral ttp, 5/5 ehl, sensory intact bl le,. +pain w rom R hip

## 2017-10-16 NOTE — H&P ADULT - NSHPPHYSICALEXAM_GEN_ALL_CORE
Physical Exam  Gen: NAD  R LE: skin intact, 3cm LLD, unable to SLR, + log roll, +ttp hip/groin, no ttp elsewhere, +ehl/fhl/ta/gs function, no calf ttp, dp/pt pulse intact, compartments soft  Secondary survey: benign, nv intact, able to SLR contralateral leg, negative log roll contralateral leg, no bony ttp elsewhere, bilateral upper extremity skin intact with no gross deformity, non tender to palpation over bony prominences, neurovascularly intact

## 2017-10-16 NOTE — ED PROVIDER NOTE - MEDICAL DECISION MAKING DETAILS
R hip pain concern for fx, more likely arthritis, pain does not appear to be from back or neuropathic.

## 2017-10-17 LAB
% ALBUMIN: 61.3 % — SIGNIFICANT CHANGE UP
% ALPHA 1: 5.5 % — SIGNIFICANT CHANGE UP
% ALPHA 2: 13.3 % — SIGNIFICANT CHANGE UP
% BETA: 10.4 % — SIGNIFICANT CHANGE UP
% GAMMA: 9.5 % — SIGNIFICANT CHANGE UP
ALBUMIN SERPL ELPH-MCNC: 3.9 G/DL — SIGNIFICANT CHANGE UP (ref 3.6–5.5)
ALBUMIN/GLOB SERPL ELPH: 1.6 RATIO — SIGNIFICANT CHANGE UP
ALPHA1 GLOB SERPL ELPH-MCNC: 0.4 G/DL — SIGNIFICANT CHANGE UP (ref 0.1–0.4)
ALPHA2 GLOB SERPL ELPH-MCNC: 0.9 G/DL — SIGNIFICANT CHANGE UP (ref 0.5–1)
B-GLOBULIN SERPL ELPH-MCNC: 0.7 G/DL — SIGNIFICANT CHANGE UP (ref 0.5–1)
GAMMA GLOBULIN: 0.6 G/DL — SIGNIFICANT CHANGE UP (ref 0.6–1.6)
PROT PATTERN SERPL ELPH-IMP: SIGNIFICANT CHANGE UP

## 2017-10-17 PROCEDURE — 93971 EXTREMITY STUDY: CPT | Mod: 26

## 2017-10-17 PROCEDURE — 72196 MRI PELVIS W/DYE: CPT | Mod: 26

## 2017-10-17 RX ORDER — HYDROMORPHONE HYDROCHLORIDE 2 MG/ML
1 INJECTION INTRAMUSCULAR; INTRAVENOUS; SUBCUTANEOUS ONCE
Qty: 0 | Refills: 0 | Status: DISCONTINUED | OUTPATIENT
Start: 2017-10-17 | End: 2017-10-17

## 2017-10-17 RX ADMIN — HYDROMORPHONE HYDROCHLORIDE 1 MILLIGRAM(S): 2 INJECTION INTRAMUSCULAR; INTRAVENOUS; SUBCUTANEOUS at 00:33

## 2017-10-17 RX ADMIN — Medication 2 MILLIGRAM(S): at 06:14

## 2017-10-17 RX ADMIN — OXYCODONE HYDROCHLORIDE 10 MILLIGRAM(S): 5 TABLET ORAL at 10:25

## 2017-10-17 RX ADMIN — OXYCODONE HYDROCHLORIDE 10 MILLIGRAM(S): 5 TABLET ORAL at 20:15

## 2017-10-17 RX ADMIN — Medication 5 MILLIGRAM(S): at 12:23

## 2017-10-17 RX ADMIN — HEPARIN SODIUM 5000 UNIT(S): 5000 INJECTION INTRAVENOUS; SUBCUTANEOUS at 22:48

## 2017-10-17 RX ADMIN — OXYCODONE HYDROCHLORIDE 10 MILLIGRAM(S): 5 TABLET ORAL at 02:45

## 2017-10-17 RX ADMIN — FAMOTIDINE 20 MILLIGRAM(S): 10 INJECTION INTRAVENOUS at 17:39

## 2017-10-17 RX ADMIN — GABAPENTIN 300 MILLIGRAM(S): 400 CAPSULE ORAL at 22:48

## 2017-10-17 RX ADMIN — OXYCODONE HYDROCHLORIDE 10 MILLIGRAM(S): 5 TABLET ORAL at 10:56

## 2017-10-17 RX ADMIN — Medication 20 MILLIGRAM(S): at 22:48

## 2017-10-17 RX ADMIN — HYDROMORPHONE HYDROCHLORIDE 1 MILLIGRAM(S): 2 INJECTION INTRAMUSCULAR; INTRAVENOUS; SUBCUTANEOUS at 00:45

## 2017-10-17 RX ADMIN — Medication 2 MILLIGRAM(S): at 17:39

## 2017-10-17 RX ADMIN — Medication 20 MILLIGRAM(S): at 11:14

## 2017-10-17 RX ADMIN — OXYCODONE HYDROCHLORIDE 10 MILLIGRAM(S): 5 TABLET ORAL at 02:12

## 2017-10-17 RX ADMIN — OXYCODONE HYDROCHLORIDE 10 MILLIGRAM(S): 5 TABLET ORAL at 06:45

## 2017-10-17 RX ADMIN — Medication 0.5 MILLIGRAM(S): at 00:32

## 2017-10-17 RX ADMIN — HEPARIN SODIUM 5000 UNIT(S): 5000 INJECTION INTRAVENOUS; SUBCUTANEOUS at 06:15

## 2017-10-17 RX ADMIN — OXYCODONE HYDROCHLORIDE 10 MILLIGRAM(S): 5 TABLET ORAL at 19:41

## 2017-10-17 RX ADMIN — Medication 5 MILLIGRAM(S): at 07:33

## 2017-10-17 RX ADMIN — OXYCODONE HYDROCHLORIDE 10 MILLIGRAM(S): 5 TABLET ORAL at 06:15

## 2017-10-17 RX ADMIN — GABAPENTIN 300 MILLIGRAM(S): 400 CAPSULE ORAL at 06:14

## 2017-10-17 RX ADMIN — FAMOTIDINE 20 MILLIGRAM(S): 10 INJECTION INTRAVENOUS at 06:14

## 2017-10-17 RX ADMIN — GABAPENTIN 300 MILLIGRAM(S): 400 CAPSULE ORAL at 13:35

## 2017-10-17 RX ADMIN — HEPARIN SODIUM 5000 UNIT(S): 5000 INJECTION INTRAVENOUS; SUBCUTANEOUS at 13:35

## 2017-10-17 NOTE — PROGRESS NOTE ADULT - ASSESSMENT
61 yo woman present with a hx of atraumatic hip pain. scheduled for surgical evaluation and treatent to r/o infection vs neoplasm vs AVN 61 yo woman present with a hx of atraumatic hip pain.  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 MRI with CT confirm AV necrosis. Scheduled  for Right THR on october 19,2017. Th patient has no range of motion of this hip at present.

## 2017-10-17 NOTE — PROGRESS NOTE ADULT - SUBJECTIVE AND OBJECTIVE BOX
62y Female community ambulator w/o assistive devices presents c/o R hip pain and inability to ambulate. The patient has had increasing atraumatic hip pain for 5 months. 3 months ago she started using a can to ambulate. She is now unable to ambulate because of pain.  Denies numbness/tingling. Denies fever/chills. Denies pain/injury elsewhere. Denies trauma. History of uterine cancer sp TAHBSH and radiation 2014 and 2015 respectively. denies smoking history, alcohol history, history of steroid use. Patient scheduled for surgical evaluation of HIP    Patient is a 62y old  Female who presents with a chief complaint of inability to ambulate (16 Oct 2017 18:39)      PAST MEDICAL & SURGICAL HISTORY:  GERD (gastroesophageal reflux disease)  Diverticulitis  Uterine cancer: 2014  Radiation  completed  2015  Depression with anxiety: Since  controlled on Meds &amp; [place] Visit to Psychiatrist  S/P hernia repair: 2015  S/P YEIMY-BSO (total abdominal hysterectomy and bilateral salpingo-oophorectomy): 2014  Status post bunionectomy: Bilateral   S/P tonsillectomy    allergies: chocolat      MEDICATIONS  (STANDING):  famotidine    Tablet 20 milliGRAM(s) Oral every 12 hours  FLUoxetine 20 milliGRAM(s) Oral daily  gabapentin 300 milliGRAM(s) Oral three times a day  heparin  Injectable 5000 Unit(s) SubCutaneous every 8 hours  thiothixene 5 milliGRAM(s) Oral <User Schedule>  thiothixene 20 milliGRAM(s) Oral at bedtime  trihexyphenidyl 2 milliGRAM(s) Oral two times a day    MEDICATIONS  (PRN):  acetaminophen   Tablet 650 milliGRAM(s) Oral every 6 hours PRN For Temp greater than 38 C (100.4 F)  acetaminophen   Tablet. 325 milliGRAM(s) Oral every 4 hours PRN Mild Pain (1 - 3)  diphenhydrAMINE   Capsule 25 milliGRAM(s) Oral at bedtime PRN Insomnia  HYDROmorphone  Injectable 0.5 milliGRAM(s) IV Push every 4 hours PRN breakthrough  oxyCODONE    IR 10 milliGRAM(s) Oral every 4 hours PRN Severe Pain (7 - 10)  oxyCODONE    IR 5 milliGRAM(s) Oral every 4 hours PRN Moderate Pain (4 - 6)    Soc HX:  Tobacco: Neg  ETOH: Neg  Drugs: neg    Family Hx:  as per my conversation with the patient, non contributory    CONSTITUTIONAL: No weakness, fevers or chills  EYES/ENT: No visual changes;  No vertigo or throat pain   NECK: No pain or stiffness  RESPIRATORY: No cough, wheezing, hemoptysis; No shortness of breath  CARDIOVASCULAR: No chest pain or palpitations  GASTROINTESTINAL: No abdominal or epigastric pain. No nausea, vomiting, or hematemesis; No diarrhea or constipation. No melena or hematochezia.  GENITOURINARY: No dysuria, frequency or hematuria  NEUROLOGICAL: No numbness or weakness  SKIN: No itching, burning, rashes, or lesions   MUSCULOSKELETAL: right hip pain    IVital Signs Last 24 Hrs  T(C): 36.4 (17 Oct 2017 12:57), Max: 36.9 (17 Oct 2017 08:18)  T(F): 97.5 (17 Oct 2017 12:57), Max: 98.4 (17 Oct 2017 08:18)  HR: 78 (17 Oct 2017 12:57) (75 - 83)  BP: 117/74 (17 Oct 2017 12:57) (109/76 - 135/76)  BP(mean): --  RR: 18 (17 Oct 2017 12:57) (16 - 18)  SpO2: 100% (17 Oct 2017 12:57) (95% - 100%)  16 Oct 2017 07:01  -  16 Oct 2017 22:53  --------------------------------------------------------  IN: 280 mL / OUT: 0 mL / NET: 280 mL        PHYSICAL EXAM:  GENERAL: NAD, well-groomed, well-developed  HEAD:  Atraumatic, Normocephalic  EYES: EOMI, PERRLA, conjunctiva and sclera clear  ENMT: No tonsillar erythema, exudates, or enlargement; Moist mucous membranes, Good dentition, No lesions  NECK: Supple, No JVD, Normal thyroid  NERVOUS SYSTEM:  Alert & Oriented X3, Good concentration; Motor Strength 5/5 B/L upper and lower extremities; weakness in RLE due to pain  CHEST/LUNG: Clear to percussion bilaterally; No rales, rhonchi, wheezing, or rubs  HEART: Regular rate and rhythm; No murmurs, rubs, or gallops  ABDOMEN: Soft, Nontender, Nondistended; Bowel sounds present  EXTREMITIES:  2+ Peripheral Pulses, No clubbing, cyanosis, or edema  LYMPH: No lymphadenopathy noted  SKIN: No rashes or lesions        LABS:                     CBC Full  -  ( 16 Oct 2017 15:51 )  WBC Count : 9.0 K/uL  Hemoglobin : 13.3 g/dL  Hematocrit : 38.2 %  Platelet Count - Automated : 384 K/uL  Mean Cell Volume : 88.3 fl  Mean Cell Hemoglobin : 30.8 pg  Mean Cell Hemoglobin Concentration : 34.8 gm/dL  Auto Neutrophil # : 7.6 K/uL  Auto Lymphocyte # : 0.6 K/uL  Auto Monocyte # : 0.7 K/uL  Auto Eosinophil # : 0.0 K/uL  Auto Basophil # : 0.0 K/uL  Auto Neutrophil % : 84.5 %  Auto Lymphocyte % : 6.4 %  Auto Monocyte % : 8.3 %  Auto Eosinophil % : 0.3 %  Auto Basophil % : 0.5 %    10-16    136  |  97  |  15  ----------------------------<  104<H>  3.9   |  24  |  0.75    Ca    9.8      16 Oct 2017 15:51    TPro  6.4  /  Alb  3.9  /  TBili  x   /  DBili  x   /  AST  x   /  ALT  x   /  AlkPhos  x   10-16    LIVER FUNCTIONS - ( 16 Oct 2017 17:00 )  Alb: 3.9 g/dL / Pro: 6.4 g/dL / ALK PHOS: x     / ALT: x     / AST: x     / GGT: x           PT/INR - ( 16 Oct 2017 15:51 )   PT: 11.0 sec;   INR: 1.01 ratio         PTT - ( 16 Oct 2017 15:51 )  PTT:28.3 sec  Urinalysis Basic - ( 16 Oct 2017 16:10 )    Color: PL Yellow / Appearance: Clear / S.007 / pH: x  Gluc: x / Ketone: Moderate  / Bili: Negative / Urobili: Negative   Blood: x / Protein: Negative / Nitrite: Negative   Leuk Esterase: Negative / RBC: x / WBC x   Sq Epi: x / Non Sq Epi: x / Bacteria: x    < from: MRI Pelvis Bony Only w/Cont (10.17.17 @ 02:05) >      < end of copied text >  < from: MRI Pelvis Bony Only w/Cont (10..17 @ 02:05) >  Correlation is made with a prior CT of the abdomen and pelvis from   2017.    FINDINGS: As noted on prior x-rays, there is complete resorption of the   right femoral head with superolateral migration of the remainder of the   femur. There is opposing severe resorption of the acetabulum. The margins   of the resorbed femur and acetabulum are sclerotic. There is a large   right hip joint effusion with bony debris and mineralization throughout   the joint. There is marked marrow edema and enhancement within the   proximal right femur and acetabulum and there is marked edema and   enhancement in the muscles/soft tissues about the right hip. 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    There is lower lumbar spondylosis. There is a nonspecific small left hip   joint effusion. There are no advanced arthritic changes at the left hip.    IMPRESSION: Severe destructive arthrosis of the right hip as above. 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  Infectious arthropathy is a less likely consideration   given the sclerotic bony margins.      < end of copied text > 62y Female community ambulator w/o assistive devices presents c/o R hip pain and inability to ambulate. The patient has had increasing atraumatic hip pain for 5 months. 3 months ago she started using a can to ambulate. She is now unable to ambulate because of pain.  Denies numbness/tingling. Denies fever/chills. Denies pain/injury elsewhere. Denies trauma. History of uterine cancer sp TAHBSH and radiation 2014 and 2015 respectively. denies smoking history, alcohol history, history of steroid use. Patient scheduled for surgical evaluation of HIP.  Evaluation to date cims ight hip degeneration c/w avascular necrosis.    Patient is a 62y old  Female who presents with a chief complaint of inability to ambulate (16 Oct 2017 18:39)      PAST MEDICAL & SURGICAL HISTORY:  GERD (gastroesophageal reflux disease)  Diverticulitis  Uterine cancer: 2014  Radiation  completed  2015  Depression with anxiety: Since  controlled on Meds &amp; [place] to Psychiatrist  S/P hernia repair: 2015  S/P YEIMY-BSO (total abdominal hysterectomy and bilateral salpingo-oophorectomy): 2014  Status post bunionectomy: Bilateral   S/P tonsillectomy    allergies: chocolat      MEDICATIONS  (STANDING):  famotidine    Tablet 20 milliGRAM(s) Oral every 12 hours  FLUoxetine 20 milliGRAM(s) Oral daily  gabapentin 300 milliGRAM(s) Oral three times a day  heparin  Injectable 5000 Unit(s) SubCutaneous every 8 hours  thiothixene 5 milliGRAM(s) Oral <User Schedule>  thiothixene 20 milliGRAM(s) Oral at bedtime  trihexyphenidyl 2 milliGRAM(s) Oral two times a day    MEDICATIONS  (PRN):  acetaminophen   Tablet 650 milliGRAM(s) Oral every 6 hours PRN For Temp greater than 38 C (100.4 F)  acetaminophen   Tablet. 325 milliGRAM(s) Oral every 4 hours PRN Mild Pain (1 - 3)  diphenhydrAMINE   Capsule 25 milliGRAM(s) Oral at bedtime PRN Insomnia  HYDROmorphone  Injectable 0.5 milliGRAM(s) IV Push every 4 hours PRN breakthrough  oxyCODONE    IR 10 milliGRAM(s) Oral every 4 hours PRN Severe Pain (7 - 10)  oxyCODONE    IR 5 milliGRAM(s) Oral every 4 hours PRN Moderate Pain (4 - 6)    Soc HX:  Tobacco: Neg  ETOH: Neg  Drugs: neg    Family Hx:  as per my conversation with the patient, non contributory    CONSTITUTIONAL: No weakness, fevers or chills  EYES/ENT: No visual changes;  No vertigo or throat pain   NECK: No pain or stiffness  RESPIRATORY: No cough, wheezing, hemoptysis; No shortness of breath  CARDIOVASCULAR: No chest pain or palpitations  GASTROINTESTINAL: No abdominal or epigastric pain. No nausea, vomiting, or hematemesis; No diarrhea or constipation. No melena or hematochezia.  GENITOURINARY: No dysuria, frequency or hematuria  NEUROLOGICAL: No numbness or weakness  SKIN: No itching, burning, rashes, or lesions   MUSCULOSKELETAL: right hip pain    IVital Signs Last 24 Hrs  T(C): 36.4 (17 Oct 2017 12:57), Max: 36.9 (17 Oct 2017 08:18)  T(F): 97.5 (17 Oct 2017 12:57), Max: 98.4 (17 Oct 2017 08:18)  HR: 78 (17 Oct 2017 12:57) (75 - 83)  BP: 117/74 (17 Oct 2017 12:57) (109/76 - 135/76)  BP(mean): --  RR: 18 (17 Oct 2017 12:57) (16 - 18)  SpO2: 100% (17 Oct 2017 12:57) (95% - 100%)  16 Oct 2017 07:01  -  16 Oct 2017 22:53  --------------------------------------------------------  IN: 280 mL / OUT: 0 mL / NET: 280 mL        PHYSICAL EXAM:  GENERAL: NAD, well-groomed, well-developed  HEAD:  Atraumatic, Normocephalic  EYES: EOMI, PERRLA, conjunctiva and sclera clear  ENMT: No tonsillar erythema, exudates, or enlargement; Moist mucous membranes, Good dentition, No lesions  NECK: Supple, No JVD, Normal thyroid  NERVOUS SYSTEM:  Alert & Oriented X3, Good concentration; Motor Strength 5/5 B/L upper and lower extremities; weakness in RLE due to pain  CHEST/LUNG: Clear to percussion bilaterally; No rales, rhonchi, wheezing, or rubs  HEART: Regular rate and rhythm; No murmurs, rubs, or gallops  ABDOMEN: Soft, Nontender, Nondistended; Bowel sounds present  EXTREMITIES:  2+ Peripheral Pulses, No clubbing, cyanosis, or edema  LYMPH: No lymphadenopathy noted  SKIN: No rashes or lesions        LABS:                     CBC Full  -  ( 16 Oct 2017 15:51 )  WBC Count : 9.0 K/uL  Hemoglobin : 13.3 g/dL  Hematocrit : 38.2 %  Platelet Count - Automated : 384 K/uL  Mean Cell Volume : 88.3 fl  Mean Cell Hemoglobin : 30.8 pg  Mean Cell Hemoglobin Concentration : 34.8 gm/dL  Auto Neutrophil # : 7.6 K/uL  Auto Lymphocyte # : 0.6 K/uL  Auto Monocyte # : 0.7 K/uL  Auto Eosinophil # : 0.0 K/uL  Auto Basophil # : 0.0 K/uL  Auto Neutrophil % : 84.5 %  Auto Lymphocyte % : 6.4 %  Auto Monocyte % : 8.3 %  Auto Eosinophil % : 0.3 %  Auto Basophil % : 0.5 %    1016    136  |  97  |  15  ----------------------------<  104<H>  3.9   |  24  |  0.75    Ca    9.8      16 Oct 2017 15:51    TPro  6.4  /  Alb  3.9  /  TBili  x   /  DBili  x   /  AST  x   /  ALT  x   /  AlkPhos  x   10-16    LIVER FUNCTIONS - ( 16 Oct 2017 17:00 )  Alb: 3.9 g/dL / Pro: 6.4 g/dL / ALK PHOS: x     / ALT: x     / AST: x     / GGT: x           PT/INR - ( 16 Oct 2017 15:51 )   PT: 11.0 sec;   INR: 1.01 ratio         PTT - ( 16 Oct 2017 15:51 )  PTT:28.3 sec  Urinalysis Basic - ( 16 Oct 2017 16:10 )    Color: PL Yellow / Appearance: Clear / S.007 / pH: x  Gluc: x / Ketone: Moderate  / Bili: Negative / Urobili: Negative   Blood: x / Protein: Negative / Nitrite: Negative   Leuk Esterase: Negative / RBC: x / WBC x   Sq Epi: x / Non Sq Epi: x / Bacteria: x    < from: MRI Pelvis Bony Only w/Cont (10.17.17 @ 02:05) >      < end of copied text >  < from: MRI Pelvis Bony Only w/Cont (10.17. @ 02:05) >  Correlation is made with a prior CT of the abdomen and pelvis from   2017.    FINDINGS: As noted on prior x-rays, there is complete resorption of the   right femoral head with superolateral migration of the remainder of the   femur. There is opposing severe resorption of the acetabulum. The margins   of the resorbed femur and acetabulum are sclerotic. There is a large   right hip joint effusion with bony debris and mineralization throughout   the joint. There is marked marrow edema and enhancement within the   proximal right femur and acetabulum and there is marked edema and   enhancement in the muscles/soft tissues about the right hip. 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    There is lower lumbar spondylosis. There is a nonspecific small left hip   joint effusion. There are no advanced arthritic changes at the left hip.    IMPRESSION: Severe destructive arthrosis of the right hip as above. 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  Infectious arthropathy is a less likely consideration   given the sclerotic bony margins.      < end of copied text >

## 2017-10-17 NOTE — PATIENT PROFILE ADULT. - VISION (WITH CORRECTIVE LENSES IF THE PATIENT USUALLY WEARS THEM):
wears glasses when driving/Normal vision: sees adequately in most situations; can see medication labels, newsprint

## 2017-10-17 NOTE — PROGRESS NOTE ADULT - SUBJECTIVE AND OBJECTIVE BOX
10-17-17 @ 06:38    Pt comfortable.  Pain well controlled.  No overnight events.    T(C): 36.6 (10-17-17 @ 06:11), Max: 36.6 (10-16-17 @ 18:55)  HR: 82 (10-17-17 @ 06:11) (75 - 88)  BP: 110/62 (10-17-17 @ 06:11) (110/62 - 147/81)  RR: 16 (10-17-17 @ 06:11) (16 - 18)  SpO2: 99% (10-17-17 @ 06:11) (95% - 100%)    Right LE N/V Intact.  +DF/PF.  +Distal Pulses.   Motor/Sensory function grossly intact.  Calves soft/NT with venodynes  intact.

## 2017-10-18 ENCOUNTER — APPOINTMENT (OUTPATIENT)
Dept: INTERNAL MEDICINE | Facility: CLINIC | Age: 62
End: 2017-10-18

## 2017-10-18 LAB
ANION GAP SERPL CALC-SCNC: 11 MMOL/L — SIGNIFICANT CHANGE UP (ref 5–17)
APTT BLD: 28.3 SEC — SIGNIFICANT CHANGE UP (ref 27.5–37.4)
BLD GP AB SCN SERPL QL: NEGATIVE — SIGNIFICANT CHANGE UP
BUN SERPL-MCNC: 12 MG/DL — SIGNIFICANT CHANGE UP (ref 7–23)
CALCIUM SERPL-MCNC: 9.1 MG/DL — SIGNIFICANT CHANGE UP (ref 8.4–10.5)
CHLORIDE SERPL-SCNC: 98 MMOL/L — SIGNIFICANT CHANGE UP (ref 96–108)
CO2 SERPL-SCNC: 28 MMOL/L — SIGNIFICANT CHANGE UP (ref 22–31)
CREAT SERPL-MCNC: 0.66 MG/DL — SIGNIFICANT CHANGE UP (ref 0.5–1.3)
GLUCOSE SERPL-MCNC: 104 MG/DL — HIGH (ref 70–99)
HCT VFR BLD CALC: 36.1 % — SIGNIFICANT CHANGE UP (ref 34.5–45)
HGB BLD-MCNC: 12.1 G/DL — SIGNIFICANT CHANGE UP (ref 11.5–15.5)
INR BLD: 0.96 RATIO — SIGNIFICANT CHANGE UP (ref 0.88–1.16)
MCHC RBC-ENTMCNC: 28.8 PG — SIGNIFICANT CHANGE UP (ref 27–34)
MCHC RBC-ENTMCNC: 33.5 GM/DL — SIGNIFICANT CHANGE UP (ref 32–36)
MCV RBC AUTO: 86 FL — SIGNIFICANT CHANGE UP (ref 80–100)
PLATELET # BLD AUTO: 332 K/UL — SIGNIFICANT CHANGE UP (ref 150–400)
POTASSIUM SERPL-MCNC: 4.4 MMOL/L — SIGNIFICANT CHANGE UP (ref 3.5–5.3)
POTASSIUM SERPL-SCNC: 4.4 MMOL/L — SIGNIFICANT CHANGE UP (ref 3.5–5.3)
PROTHROM AB SERPL-ACNC: 10.8 SEC — SIGNIFICANT CHANGE UP (ref 10–13.1)
RBC # BLD: 4.2 M/UL — SIGNIFICANT CHANGE UP (ref 3.8–5.2)
RBC # FLD: 13.5 % — SIGNIFICANT CHANGE UP (ref 10.3–14.5)
RH IG SCN BLD-IMP: NEGATIVE — SIGNIFICANT CHANGE UP
SODIUM SERPL-SCNC: 137 MMOL/L — SIGNIFICANT CHANGE UP (ref 135–145)
WBC # BLD: 5.6 K/UL — SIGNIFICANT CHANGE UP (ref 3.8–10.5)
WBC # FLD AUTO: 5.6 K/UL — SIGNIFICANT CHANGE UP (ref 3.8–10.5)

## 2017-10-18 RX ORDER — SODIUM CHLORIDE 9 MG/ML
1000 INJECTION, SOLUTION INTRAVENOUS
Qty: 0 | Refills: 0 | Status: DISCONTINUED | OUTPATIENT
Start: 2017-10-18 | End: 2017-10-19

## 2017-10-18 RX ADMIN — FAMOTIDINE 20 MILLIGRAM(S): 10 INJECTION INTRAVENOUS at 17:37

## 2017-10-18 RX ADMIN — Medication 5 MILLIGRAM(S): at 11:21

## 2017-10-18 RX ADMIN — Medication 20 MILLIGRAM(S): at 17:37

## 2017-10-18 RX ADMIN — Medication 2 MILLIGRAM(S): at 06:44

## 2017-10-18 RX ADMIN — Medication 2 MILLIGRAM(S): at 17:37

## 2017-10-18 RX ADMIN — HEPARIN SODIUM 5000 UNIT(S): 5000 INJECTION INTRAVENOUS; SUBCUTANEOUS at 06:45

## 2017-10-18 RX ADMIN — GABAPENTIN 300 MILLIGRAM(S): 400 CAPSULE ORAL at 14:28

## 2017-10-18 RX ADMIN — OXYCODONE HYDROCHLORIDE 10 MILLIGRAM(S): 5 TABLET ORAL at 11:16

## 2017-10-18 RX ADMIN — Medication 5 MILLIGRAM(S): at 06:44

## 2017-10-18 RX ADMIN — HEPARIN SODIUM 5000 UNIT(S): 5000 INJECTION INTRAVENOUS; SUBCUTANEOUS at 14:29

## 2017-10-18 RX ADMIN — HEPARIN SODIUM 5000 UNIT(S): 5000 INJECTION INTRAVENOUS; SUBCUTANEOUS at 22:28

## 2017-10-18 RX ADMIN — Medication 20 MILLIGRAM(S): at 22:28

## 2017-10-18 RX ADMIN — OXYCODONE HYDROCHLORIDE 10 MILLIGRAM(S): 5 TABLET ORAL at 20:40

## 2017-10-18 RX ADMIN — OXYCODONE HYDROCHLORIDE 10 MILLIGRAM(S): 5 TABLET ORAL at 19:47

## 2017-10-18 RX ADMIN — OXYCODONE HYDROCHLORIDE 10 MILLIGRAM(S): 5 TABLET ORAL at 11:45

## 2017-10-18 RX ADMIN — GABAPENTIN 300 MILLIGRAM(S): 400 CAPSULE ORAL at 22:28

## 2017-10-18 RX ADMIN — GABAPENTIN 300 MILLIGRAM(S): 400 CAPSULE ORAL at 06:45

## 2017-10-18 RX ADMIN — FAMOTIDINE 20 MILLIGRAM(S): 10 INJECTION INTRAVENOUS at 06:44

## 2017-10-18 NOTE — PROGRESS NOTE ADULT - ASSESSMENT
61 yo F w/ R Hip Arthrosis scheduled for R Total Hip Arthroplasty  -pain control  -DVT ppx, Hold at MN, SCD's  -NPO/IVF after MN  -FU labs  -Plan for OR 10/19  -Incentive Spirometry

## 2017-10-18 NOTE — PROGRESS NOTE ADULT - ASSESSMENT
Patient is a 62y old  Female who presents with a chief complaint of inability to ambulate (16 Oct 2017 18:39)      HPI:    MEDICATIONS  (STANDING):  famotidine    Tablet 20 milliGRAM(s) Oral every 12 hours  FLUoxetine 20 milliGRAM(s) Oral daily  gabapentin 300 milliGRAM(s) Oral three times a day  heparin  Injectable 5000 Unit(s) SubCutaneous every 8 hours  lactated ringers. 1000 milliLiter(s) (75 mL/Hr) IV Continuous <Continuous>  thiothixene 5 milliGRAM(s) Oral <User Schedule>  thiothixene 20 milliGRAM(s) Oral at bedtime  trihexyphenidyl 2 milliGRAM(s) Oral two times a day    MEDICATIONS  (PRN):  acetaminophen   Tablet 650 milliGRAM(s) Oral every 6 hours PRN For Temp greater than 38 C (100.4 F)  acetaminophen   Tablet. 325 milliGRAM(s) Oral every 4 hours PRN Mild Pain (1 - 3)  diphenhydrAMINE   Capsule 25 milliGRAM(s) Oral at bedtime PRN Insomnia  HYDROmorphone  Injectable 0.5 milliGRAM(s) IV Push every 4 hours PRN breakthrough  oxyCODONE    IR 10 milliGRAM(s) Oral every 4 hours PRN Severe Pain (7 - 10)  oxyCODONE    IR 5 milliGRAM(s) Oral every 4 hours PRN Moderate Pain (4 - 6)      Allergies    chocolate (Rash)  No Known Drug Allergies    Intolerances        REVIEW OF SYSTEM:  CONSTITUTIONAL: No fever, No change in weight, No fatigue  HEAD: No headache, No dizziness, No recent trauma  EYES: No eye pain, No visual disturbances, No discharge  ENT:  No difficulty hearing, No tinnitus, No vertigo, No sinus pain, No throat pain  NECK: No pain, No stiffness  BREASTS: No pain, No masses, No nipple discharge  RESPIRATORY: No cough, No wheezing, No chills, No hemoptysis, No shortness of breath at rest or exertional shortness of breath  CARDIOVASCULAR: No chest pain, No palpitations, No dizziness, No CHF, No arrhythmia, No cardiomegaly, No leg swelling  GASTROINTESTINAL: No abdominal, No epigastric pain. No nausea, No vomiting, No hematemesis, No diarrhea, No constipation. No melena, No hematochezia. No GERD  GENITOURINARY: No dysuria, No frequency, No hematuria, No incontinence, No nocturia, No hesitancy,  SKIN: No itching, No burning, No rashes, No lesions   LYMPH NODES: No history of enlarged glands  ENDOCRINE: No heat or cold intolerance, No hair loss. No osteoporosis, No thyroid disease  MUSCULOSKELETAL: No joint pain or swelling, No muscle, back, or extremity pain  PSYCHIATRIC: No depression, No anxiety, No mood swings, No difficulty sleeping  HEME/LYMPH: No easy bruising, No anticoagulants, No bleeding disorder, No bleeding gums  ALLERGY AND IMMUNOLOGIC: No hives, No eczema  NEUROLOGICAL: No memory loss, No loss of strength, No numbness, No tremors        VITALS:   T(C): 36.9 (10-18-17 @ 16:41), Max: 36.9 (10-18-17 @ 16:41)  HR: 79 (10-18-17 @ 16:41) (71 - 83)  BP: 108/71 (10-18-17 @ 16:41) (91/56 - 113/73)  RR: 18 (10-18-17 @ 16:41) (18 - 18)  SpO2: 100% (10-18-17 @ 16:41) (97% - 100%)  Wt(kg): --    10-17 @ 07:01  -  10-18 @ 07:00  --------------------------------------------------------  IN: 660 mL / OUT: 300 mL / NET: 360 mL    10-18 @ 07:01  -  10-18 @ 20:30  --------------------------------------------------------  IN: 360 mL / OUT: 400 mL / NET: -40 mL        PHYSICAL EXAM:  GENERAL: NAD, well nourished and conversant  HEAD:  Atraumatic  EYES: EOM, PERRLA, conjunctiva pink and sclera white  ENT: No tonsillar erythema, exudates, or enlargement, moist mucous membranes, good dentition, no lesions  NECK: Supple, No JVD, normal thyroid, carotids with normal upstrokes and no bruits  CHEST/LUNG: Clear to auscultation bilaterally, No rales, rhonchi, wheezing, or rubs  HEART: Regular rate and rhythm, No murmurs, rubs, or gallops  ABDOMEN: Soft, nondistended, no masses, guarding, tenderness or rebound, bowel sounds present  EXTREMITIES:  2+ Peripheral Pulses, No clubbing, cyanosis, or edema. No arthritis of shoulders, elbows, hands, hips, knees, ankles, or feet. No DJD C spine, T spine, or L/S spine  LYMPH: No lymphadenopathy noted  SKIN: No rashes or lesions  NERVOUS SYSTEM:  Alert & Oriented X3, normal cognitive function. Motor Strength 5/5 right upper and right lower.  5/5 left upper and left lower extremities, DTRs 2+ intact and symmetric    LABS:                          12.1   5.60  )-----------( 332      ( 18 Oct 2017 08:30 )             36.1     10-18    137  |  98  |  12  ----------------------------<  104<H>  4.4   |  28  |  0.66  10-16    136  |  97  |  15  ----------------------------<  104<H>  3.9   |  24  |  0.75    Ca    9.1      18 Oct 2017 08:33  Ca    9.8      16 Oct 2017 15:51    TPro  6.4  /  Alb  3.9  /  TBili  x   /  DBili  x   /  AST  x   /  ALT  x   /  AlkPhos  x   10-16  TPro  6.8  /  Alb  4.2  /  TBili  0.5  /  DBili  x   /  AST  15  /  ALT  11  /  AlkPhos  89  10-16    CAPILLARY BLOOD GLUCOSE          RADIOLOGY & ADDITIONAL TESTS:      Consultant(s):    Care Discussed with Consultants/Other Providers [ ] YES  [ ] NO

## 2017-10-18 NOTE — PROGRESS NOTE ADULT - ATTENDING COMMENTS
The patient is medically stable, medically optimized and has no medical contraindication to surgery as required. Exam time 70 minutes including > than 50 % for bedside discussion and counseling.

## 2017-10-18 NOTE — PROGRESS NOTE ADULT - SUBJECTIVE AND OBJECTIVE BOX
Patient is a 62y old  Female who presents with a chief complaint of inability to ambulate (16 Oct 2017 18:39)      HPI:   Patient with inability to ambulate due to pain in the hip with radiology showing severely destroyed  hip joint Etiology is wither malignant, AVN or infection Ortho working up the etiology. Pain reasonably well controlled. Patient scheduled for surgery on 10/19.    MEDICATIONS  (STANDING):  famotidine    Tablet 20 milliGRAM(s) Oral every 12 hours  FLUoxetine 20 milliGRAM(s) Oral daily  gabapentin 300 milliGRAM(s) Oral three times a day  heparin  Injectable 5000 Unit(s) SubCutaneous every 8 hours  lactated ringers. 1000 milliLiter(s) (75 mL/Hr) IV Continuous <Continuous>  thiothixene 5 milliGRAM(s) Oral <User Schedule>  thiothixene 20 milliGRAM(s) Oral at bedtime  trihexyphenidyl 2 milliGRAM(s) Oral two times a day    MEDICATIONS  (PRN):  acetaminophen   Tablet 650 milliGRAM(s) Oral every 6 hours PRN For Temp greater than 38 C (100.4 F)  acetaminophen   Tablet. 325 milliGRAM(s) Oral every 4 hours PRN Mild Pain (1 - 3)  diphenhydrAMINE   Capsule 25 milliGRAM(s) Oral at bedtime PRN Insomnia  HYDROmorphone  Injectable 0.5 milliGRAM(s) IV Push every 4 hours PRN breakthrough  oxyCODONE    IR 10 milliGRAM(s) Oral every 4 hours PRN Severe Pain (7 - 10)  oxyCODONE    IR 5 milliGRAM(s) Oral every 4 hours PRN Moderate Pain (4 - 6)      Allergies    chocolate (Rash)  No Known Drug Allergies    Intolerances        REVIEW OF SYSTEM:  CONSTITUTIONAL: No fever, No change in weight, No fatigue  HEAD: No headache, No dizziness, No recent trauma  EYES: No eye pain, No visual disturbances, No discharge  ENT:  No difficulty hearing, No tinnitus, No vertigo, No sinus pain, No throat pain  NECK: No pain, No stiffness  BREASTS: No pain, No masses, No nipple discharge  RESPIRATORY: No cough, No wheezing, No chills, No hemoptysis, No shortness of breath at rest or exertional shortness of breath  CARDIOVASCULAR: No chest pain, No palpitations, No dizziness, No CHF, No arrhythmia, No cardiomegaly, No leg swelling  GASTROINTESTINAL: No abdominal, No epigastric pain. No nausea, No vomiting, No hematemesis, No diarrhea, No constipation. No melena, No hematochezia. No GERD  GENITOURINARY: No dysuria, No frequency, No hematuria, No incontinence, No nocturia, No hesitancy,  SKIN: No itching, No burning, No rashes, No lesions   LYMPH NODES: No history of enlarged glands  ENDOCRINE: No heat or cold intolerance, No hair loss. No osteoporosis, No thyroid disease  MUSCULOSKELETAL: No joint pain or swelling,  (+) right  extremity pain with evidnece of inflammation of hip joint and acetabulum  PSYCHIATRIC: No depression, No anxiety, No mood swings, No difficulty sleeping  HEME/LYMPH: No easy bruising, No anticoagulants, No bleeding disorder, No bleeding gums  ALLERGY AND IMMUNOLOGIC: No hives, No eczema  NEUROLOGICAL: No memory loss, No loss of strength, No numbness, No tremors        VITALS:   T(C): 36.9 (10-18-17 @ 16:41), Max: 36.9 (10-18-17 @ 16:41)  HR: 79 (10-18-17 @ 16:41) (71 - 83)  BP: 108/71 (10-18-17 @ 16:41) (91/56 - 113/73)  RR: 18 (10-18-17 @ 16:41) (18 - 18)  SpO2: 100% (10-18-17 @ 16:41) (97% - 100%)  Wt(kg): --    10-17 @ 07:01  -  10-18 @ 07:00  --------------------------------------------------------  IN: 660 mL / OUT: 300 mL / NET: 360 mL    10-18 @ 07:01  -  10-18 @ 20:14  --------------------------------------------------------  IN: 360 mL / OUT: 400 mL / NET: -40 mL        PHYSICAL EXAM:  GENERAL: NAD, well nourished and conversant  HEAD:  Atraumatic  EYES: EOM, PERRLA, conjunctiva pink and sclera white  ENT: No tonsillar erythema, exudates, or enlargement, moist mucous membranes, good dentition, no lesions  NECK: Supple, No JVD, normal thyroid, carotids with normal upstrokes and no bruits  CHEST/LUNG: Clear to auscultation bilaterally, No rales, rhonchi, wheezing, or rubs  HEART: Regular rate and rhythm, No murmurs, rubs, or gallops  ABDOMEN: Soft, nondistended, no masses, guarding, tenderness or rebound, bowel sounds present  EXTREMITIES:  2+ Peripheral Pulses, No clubbing, cyanosis, or edema.   Deformed right hip joint and destruction of alignment  of hip and acetabulum   LYMPH: No lymphadenopathy noted  SKIN: No rashes or lesions  NERVOUS SYSTEM:  Alert & Oriented X3, normal cognitive function. Motor Strength 5/5 right upper and right lower.  5/5 left upper and left lower extremities, DTRs 2+ intact and symmetric    LABS:                          12.1   5.60  )-----------( 332      ( 18 Oct 2017 08:30 )             36.1     10-18    137  |  98  |  12  ----------------------------<  104<H>  4.4   |  28  |  0.66  10-16    136  |  97  |  15  ----------------------------<  104<H>  3.9   |  24  |  0.75    Ca    9.1      18 Oct 2017 08:33  Ca    9.8      16 Oct 2017 15:51    TPro  6.4  /  Alb  3.9  /  TBili  x   /  DBili  x   /  AST  x   /  ALT  x   /  AlkPhos  x   10-16  TPro  6.8  /  Alb  4.2  /  TBili  0.5  /  DBili  x   /  AST  15  /  ALT  11  /  AlkPhos  89  10-16    CAPILLARY BLOOD GLUCOSE          RADIOLOGY & ADDITIONAL TESTS:      Consultant(s):    Care Discussed with Consultants/Other Providers [ ] YES  [ ] NO

## 2017-10-18 NOTE — PROGRESS NOTE ADULT - SUBJECTIVE AND OBJECTIVE BOX
Pt seen & examined at bedside. Resting comfortably.  No overnight events.  Pain well controlled.      Vital Signs Last 24 Hrs  T(C): 36.6 (18 Oct 2017 00:17), Max: 36.9 (17 Oct 2017 08:18)  T(F): 97.8 (18 Oct 2017 00:17), Max: 98.4 (17 Oct 2017 08:18)  HR: 83 (18 Oct 2017 00:17) (76 - 83)  BP: 91/56 (18 Oct 2017 00:17) (91/56 - 117/74)  BP(mean): --  RR: 18 (18 Oct 2017 00:17) (18 - 18)  SpO2: 97% (18 Oct 2017 00:17) (97% - 100%)    RLE:  skin intact, no erythema/ecchymosis  SILT grossly L3-S1  +EHL/FHL/GC/TA  brisk CR  Soft/NT calves

## 2017-10-19 ENCOUNTER — TRANSCRIPTION ENCOUNTER (OUTPATIENT)
Age: 62
End: 2017-10-19

## 2017-10-19 ENCOUNTER — RESULT REVIEW (OUTPATIENT)
Age: 62
End: 2017-10-19

## 2017-10-19 LAB
ANION GAP SERPL CALC-SCNC: 11 MMOL/L — SIGNIFICANT CHANGE UP (ref 5–17)
ANION GAP SERPL CALC-SCNC: 13 MMOL/L — SIGNIFICANT CHANGE UP (ref 5–17)
APTT BLD: 28.8 SEC — SIGNIFICANT CHANGE UP (ref 27.5–37.4)
BLD GP AB SCN SERPL QL: NEGATIVE — SIGNIFICANT CHANGE UP
BUN SERPL-MCNC: 14 MG/DL — SIGNIFICANT CHANGE UP (ref 7–23)
BUN SERPL-MCNC: 17 MG/DL — SIGNIFICANT CHANGE UP (ref 7–23)
CALCIUM SERPL-MCNC: 8.7 MG/DL — SIGNIFICANT CHANGE UP (ref 8.4–10.5)
CALCIUM SERPL-MCNC: 9.2 MG/DL — SIGNIFICANT CHANGE UP (ref 8.4–10.5)
CHLORIDE SERPL-SCNC: 100 MMOL/L — SIGNIFICANT CHANGE UP (ref 96–108)
CHLORIDE SERPL-SCNC: 98 MMOL/L — SIGNIFICANT CHANGE UP (ref 96–108)
CO2 SERPL-SCNC: 26 MMOL/L — SIGNIFICANT CHANGE UP (ref 22–31)
CO2 SERPL-SCNC: 28 MMOL/L — SIGNIFICANT CHANGE UP (ref 22–31)
CREAT SERPL-MCNC: 0.7 MG/DL — SIGNIFICANT CHANGE UP (ref 0.5–1.3)
CREAT SERPL-MCNC: 0.73 MG/DL — SIGNIFICANT CHANGE UP (ref 0.5–1.3)
GLUCOSE SERPL-MCNC: 105 MG/DL — HIGH (ref 70–99)
GLUCOSE SERPL-MCNC: 138 MG/DL — HIGH (ref 70–99)
HCT VFR BLD CALC: 34.5 % — SIGNIFICANT CHANGE UP (ref 34.5–45)
HCT VFR BLD CALC: 35.1 % — SIGNIFICANT CHANGE UP (ref 34.5–45)
HGB BLD-MCNC: 12.5 G/DL — SIGNIFICANT CHANGE UP (ref 11.5–15.5)
HGB BLD-MCNC: 12.5 G/DL — SIGNIFICANT CHANGE UP (ref 11.5–15.5)
INR BLD: 0.96 RATIO — SIGNIFICANT CHANGE UP (ref 0.88–1.16)
MCHC RBC-ENTMCNC: 31.7 PG — SIGNIFICANT CHANGE UP (ref 27–34)
MCHC RBC-ENTMCNC: 32.1 PG — SIGNIFICANT CHANGE UP (ref 27–34)
MCHC RBC-ENTMCNC: 35.5 GM/DL — SIGNIFICANT CHANGE UP (ref 32–36)
MCHC RBC-ENTMCNC: 36.2 GM/DL — HIGH (ref 32–36)
MCV RBC AUTO: 88.5 FL — SIGNIFICANT CHANGE UP (ref 80–100)
MCV RBC AUTO: 89.3 FL — SIGNIFICANT CHANGE UP (ref 80–100)
PLATELET # BLD AUTO: 335 K/UL — SIGNIFICANT CHANGE UP (ref 150–400)
PLATELET # BLD AUTO: 351 K/UL — SIGNIFICANT CHANGE UP (ref 150–400)
POTASSIUM SERPL-MCNC: 4.4 MMOL/L — SIGNIFICANT CHANGE UP (ref 3.5–5.3)
POTASSIUM SERPL-MCNC: 4.5 MMOL/L — SIGNIFICANT CHANGE UP (ref 3.5–5.3)
POTASSIUM SERPL-SCNC: 4.4 MMOL/L — SIGNIFICANT CHANGE UP (ref 3.5–5.3)
POTASSIUM SERPL-SCNC: 4.5 MMOL/L — SIGNIFICANT CHANGE UP (ref 3.5–5.3)
PROTHROM AB SERPL-ACNC: 10.4 SEC — SIGNIFICANT CHANGE UP (ref 9.8–12.7)
RBC # BLD: 3.9 M/UL — SIGNIFICANT CHANGE UP (ref 3.8–5.2)
RBC # BLD: 3.94 M/UL — SIGNIFICANT CHANGE UP (ref 3.8–5.2)
RBC # FLD: 11.9 % — SIGNIFICANT CHANGE UP (ref 10.3–14.5)
RBC # FLD: 12 % — SIGNIFICANT CHANGE UP (ref 10.3–14.5)
RH IG SCN BLD-IMP: NEGATIVE — SIGNIFICANT CHANGE UP
SODIUM SERPL-SCNC: 137 MMOL/L — SIGNIFICANT CHANGE UP (ref 135–145)
SODIUM SERPL-SCNC: 139 MMOL/L — SIGNIFICANT CHANGE UP (ref 135–145)
WBC # BLD: 5 K/UL — SIGNIFICANT CHANGE UP (ref 3.8–10.5)
WBC # BLD: 9.5 K/UL — SIGNIFICANT CHANGE UP (ref 3.8–10.5)
WBC # FLD AUTO: 5 K/UL — SIGNIFICANT CHANGE UP (ref 3.8–10.5)
WBC # FLD AUTO: 9.5 K/UL — SIGNIFICANT CHANGE UP (ref 3.8–10.5)

## 2017-10-19 PROCEDURE — 88305 TISSUE EXAM BY PATHOLOGIST: CPT | Mod: 26

## 2017-10-19 PROCEDURE — 88311 DECALCIFY TISSUE: CPT | Mod: 26

## 2017-10-19 PROCEDURE — 73502 X-RAY EXAM HIP UNI 2-3 VIEWS: CPT | Mod: 26

## 2017-10-19 RX ORDER — SODIUM CHLORIDE 9 MG/ML
1000 INJECTION INTRAMUSCULAR; INTRAVENOUS; SUBCUTANEOUS ONCE
Qty: 0 | Refills: 0 | Status: COMPLETED | OUTPATIENT
Start: 2017-10-19 | End: 2017-10-19

## 2017-10-19 RX ORDER — TRIHEXYPHENIDYL HCL 2 MG
2 TABLET ORAL
Qty: 0 | Refills: 0 | Status: DISCONTINUED | OUTPATIENT
Start: 2017-10-19 | End: 2017-10-23

## 2017-10-19 RX ORDER — SENNA PLUS 8.6 MG/1
2 TABLET ORAL AT BEDTIME
Qty: 0 | Refills: 0 | Status: DISCONTINUED | OUTPATIENT
Start: 2017-10-19 | End: 2017-10-23

## 2017-10-19 RX ORDER — SODIUM CHLORIDE 9 MG/ML
1000 INJECTION INTRAMUSCULAR; INTRAVENOUS; SUBCUTANEOUS ONCE
Qty: 0 | Refills: 0 | Status: COMPLETED | OUTPATIENT
Start: 2017-10-20 | End: 2017-10-20

## 2017-10-19 RX ORDER — OXYCODONE HYDROCHLORIDE 5 MG/1
10 TABLET ORAL EVERY 4 HOURS
Qty: 0 | Refills: 0 | Status: DISCONTINUED | OUTPATIENT
Start: 2017-10-19 | End: 2017-10-23

## 2017-10-19 RX ORDER — GABAPENTIN 400 MG/1
300 CAPSULE ORAL THREE TIMES A DAY
Qty: 0 | Refills: 0 | Status: DISCONTINUED | OUTPATIENT
Start: 2017-10-19 | End: 2017-10-23

## 2017-10-19 RX ORDER — ACETAMINOPHEN 500 MG
650 TABLET ORAL EVERY 6 HOURS
Qty: 0 | Refills: 0 | Status: COMPLETED | OUTPATIENT
Start: 2017-10-19 | End: 2017-10-22

## 2017-10-19 RX ORDER — ACETAMINOPHEN 500 MG
650 TABLET ORAL EVERY 6 HOURS
Qty: 0 | Refills: 0 | Status: DISCONTINUED | OUTPATIENT
Start: 2017-10-19 | End: 2017-10-23

## 2017-10-19 RX ORDER — ONDANSETRON 8 MG/1
4 TABLET, FILM COATED ORAL ONCE
Qty: 0 | Refills: 0 | Status: DISCONTINUED | OUTPATIENT
Start: 2017-10-19 | End: 2017-10-19

## 2017-10-19 RX ORDER — THIOTHIXENE 5 MG
5 CAPSULE ORAL
Qty: 0 | Refills: 0 | Status: DISCONTINUED | OUTPATIENT
Start: 2017-10-19 | End: 2017-10-23

## 2017-10-19 RX ORDER — FLUOXETINE HCL 10 MG
20 CAPSULE ORAL DAILY
Qty: 0 | Refills: 0 | Status: DISCONTINUED | OUTPATIENT
Start: 2017-10-19 | End: 2017-10-23

## 2017-10-19 RX ORDER — ONDANSETRON 8 MG/1
4 TABLET, FILM COATED ORAL EVERY 6 HOURS
Qty: 0 | Refills: 0 | Status: DISCONTINUED | OUTPATIENT
Start: 2017-10-19 | End: 2017-10-23

## 2017-10-19 RX ORDER — TRAMADOL HYDROCHLORIDE 50 MG/1
50 TABLET ORAL EVERY 8 HOURS
Qty: 0 | Refills: 0 | Status: DISCONTINUED | OUTPATIENT
Start: 2017-10-19 | End: 2017-10-23

## 2017-10-19 RX ORDER — THIOTHIXENE 5 MG
20 CAPSULE ORAL AT BEDTIME
Qty: 0 | Refills: 0 | Status: DISCONTINUED | OUTPATIENT
Start: 2017-10-19 | End: 2017-10-23

## 2017-10-19 RX ORDER — POLYETHYLENE GLYCOL 3350 17 G/17G
17 POWDER, FOR SOLUTION ORAL DAILY
Qty: 0 | Refills: 0 | Status: DISCONTINUED | OUTPATIENT
Start: 2017-10-19 | End: 2017-10-23

## 2017-10-19 RX ORDER — OXYCODONE HYDROCHLORIDE 5 MG/1
5 TABLET ORAL EVERY 4 HOURS
Qty: 0 | Refills: 0 | Status: DISCONTINUED | OUTPATIENT
Start: 2017-10-19 | End: 2017-10-23

## 2017-10-19 RX ORDER — CELECOXIB 200 MG/1
200 CAPSULE ORAL
Qty: 0 | Refills: 0 | Status: DISCONTINUED | OUTPATIENT
Start: 2017-10-19 | End: 2017-10-23

## 2017-10-19 RX ORDER — KETOROLAC TROMETHAMINE 30 MG/ML
15 SYRINGE (ML) INJECTION EVERY 8 HOURS
Qty: 0 | Refills: 0 | Status: DISCONTINUED | OUTPATIENT
Start: 2017-10-19 | End: 2017-10-20

## 2017-10-19 RX ORDER — DOCUSATE SODIUM 100 MG
100 CAPSULE ORAL THREE TIMES A DAY
Qty: 0 | Refills: 0 | Status: DISCONTINUED | OUTPATIENT
Start: 2017-10-19 | End: 2017-10-23

## 2017-10-19 RX ORDER — SODIUM CHLORIDE 9 MG/ML
1000 INJECTION INTRAMUSCULAR; INTRAVENOUS; SUBCUTANEOUS
Qty: 0 | Refills: 0 | Status: DISCONTINUED | OUTPATIENT
Start: 2017-10-19 | End: 2017-10-23

## 2017-10-19 RX ORDER — HYDROMORPHONE HYDROCHLORIDE 2 MG/ML
0.5 INJECTION INTRAMUSCULAR; INTRAVENOUS; SUBCUTANEOUS
Qty: 0 | Refills: 0 | Status: DISCONTINUED | OUTPATIENT
Start: 2017-10-19 | End: 2017-10-19

## 2017-10-19 RX ORDER — MAGNESIUM HYDROXIDE 400 MG/1
30 TABLET, CHEWABLE ORAL DAILY
Qty: 0 | Refills: 0 | Status: DISCONTINUED | OUTPATIENT
Start: 2017-10-19 | End: 2017-10-23

## 2017-10-19 RX ORDER — PANTOPRAZOLE SODIUM 20 MG/1
40 TABLET, DELAYED RELEASE ORAL DAILY
Qty: 0 | Refills: 0 | Status: DISCONTINUED | OUTPATIENT
Start: 2017-10-19 | End: 2017-10-23

## 2017-10-19 RX ORDER — CEFAZOLIN SODIUM 1 G
2000 VIAL (EA) INJECTION EVERY 8 HOURS
Qty: 0 | Refills: 0 | Status: COMPLETED | OUTPATIENT
Start: 2017-10-19 | End: 2017-10-19

## 2017-10-19 RX ORDER — HYDROMORPHONE HYDROCHLORIDE 2 MG/ML
0.5 INJECTION INTRAMUSCULAR; INTRAVENOUS; SUBCUTANEOUS EVERY 4 HOURS
Qty: 0 | Refills: 0 | Status: DISCONTINUED | OUTPATIENT
Start: 2017-10-19 | End: 2017-10-23

## 2017-10-19 RX ORDER — ENOXAPARIN SODIUM 100 MG/ML
40 INJECTION SUBCUTANEOUS EVERY 24 HOURS
Qty: 0 | Refills: 0 | Status: DISCONTINUED | OUTPATIENT
Start: 2017-10-20 | End: 2017-10-23

## 2017-10-19 RX ORDER — DIPHENHYDRAMINE HCL 50 MG
25 CAPSULE ORAL AT BEDTIME
Qty: 0 | Refills: 0 | Status: DISCONTINUED | OUTPATIENT
Start: 2017-10-19 | End: 2017-10-19

## 2017-10-19 RX ADMIN — TRAMADOL HYDROCHLORIDE 50 MILLIGRAM(S): 50 TABLET ORAL at 13:44

## 2017-10-19 RX ADMIN — Medication 20 MILLIGRAM(S): at 21:49

## 2017-10-19 RX ADMIN — Medication 650 MILLIGRAM(S): at 20:15

## 2017-10-19 RX ADMIN — TRAMADOL HYDROCHLORIDE 50 MILLIGRAM(S): 50 TABLET ORAL at 23:23

## 2017-10-19 RX ADMIN — Medication 100 MILLIGRAM(S): at 15:00

## 2017-10-19 RX ADMIN — Medication 2 MILLIGRAM(S): at 17:17

## 2017-10-19 RX ADMIN — GABAPENTIN 300 MILLIGRAM(S): 400 CAPSULE ORAL at 13:43

## 2017-10-19 RX ADMIN — Medication 15 MILLIGRAM(S): at 13:43

## 2017-10-19 RX ADMIN — Medication 15 MILLIGRAM(S): at 13:56

## 2017-10-19 RX ADMIN — Medication 100 MILLIGRAM(S): at 21:48

## 2017-10-19 RX ADMIN — PANTOPRAZOLE SODIUM 40 MILLIGRAM(S): 20 TABLET, DELAYED RELEASE ORAL at 13:43

## 2017-10-19 RX ADMIN — Medication 5 MILLIGRAM(S): at 06:02

## 2017-10-19 RX ADMIN — GABAPENTIN 300 MILLIGRAM(S): 400 CAPSULE ORAL at 21:48

## 2017-10-19 RX ADMIN — TRAMADOL HYDROCHLORIDE 50 MILLIGRAM(S): 50 TABLET ORAL at 22:53

## 2017-10-19 RX ADMIN — TRAMADOL HYDROCHLORIDE 50 MILLIGRAM(S): 50 TABLET ORAL at 13:43

## 2017-10-19 RX ADMIN — Medication 15 MILLIGRAM(S): at 21:54

## 2017-10-19 RX ADMIN — FAMOTIDINE 20 MILLIGRAM(S): 10 INJECTION INTRAVENOUS at 06:02

## 2017-10-19 RX ADMIN — Medication 2 MILLIGRAM(S): at 06:02

## 2017-10-19 RX ADMIN — Medication 100 MILLIGRAM(S): at 13:43

## 2017-10-19 RX ADMIN — Medication 5 MILLIGRAM(S): at 13:42

## 2017-10-19 RX ADMIN — Medication 20 MILLIGRAM(S): at 13:43

## 2017-10-19 RX ADMIN — Medication 15 MILLIGRAM(S): at 22:24

## 2017-10-19 RX ADMIN — SODIUM CHLORIDE 100 MILLILITER(S): 9 INJECTION INTRAMUSCULAR; INTRAVENOUS; SUBCUTANEOUS at 11:45

## 2017-10-19 RX ADMIN — Medication 100 MILLIGRAM(S): at 22:54

## 2017-10-19 RX ADMIN — GABAPENTIN 300 MILLIGRAM(S): 400 CAPSULE ORAL at 06:02

## 2017-10-19 RX ADMIN — SODIUM CHLORIDE 2000 MILLILITER(S): 9 INJECTION INTRAMUSCULAR; INTRAVENOUS; SUBCUTANEOUS at 13:15

## 2017-10-19 RX ADMIN — Medication 650 MILLIGRAM(S): at 13:43

## 2017-10-19 RX ADMIN — HYDROMORPHONE HYDROCHLORIDE 0.5 MILLIGRAM(S): 2 INJECTION INTRAMUSCULAR; INTRAVENOUS; SUBCUTANEOUS at 11:47

## 2017-10-19 NOTE — PHYSICAL THERAPY INITIAL EVALUATION ADULT - ASR EQUIP NEEDS DISCH PT EVAL
raised toilet seat/rolling walker (5 inch wheels)/Pt owns straight cane. Will need R/W for ambulation

## 2017-10-19 NOTE — PROGRESS NOTE ADULT - SUBJECTIVE AND OBJECTIVE BOX
62y Female community ambulator w/o assistive devices presents c/o R hip pain and inability to ambulate. The patient has had increasing atraumatic hip pain for 5 months. 3 months ago she started using a can to ambulate. She is now unable to ambulate because of pain.  Denies numbness/tingling. Denies fever/chills. Denies pain/injury elsewhere. Denies trauma. History of uterine cancer sp TAHBSH and radiation Nov 2014 and 4/2015 respectively. denies smoking history, alcohol history, history of steroid use. Patient scheduled for surgical evaluation of HIP.  Evaluation to date cims ight hip degeneration c/w avascular necrosis.      MEDICATIONS  (STANDING):  acetaminophen   Tablet 650 milliGRAM(s) Oral every 6 hours  ceFAZolin   IVPB 2000 milliGRAM(s) IV Intermittent every 8 hours  celecoxib 200 milliGRAM(s) Oral before breakfast  docusate sodium 100 milliGRAM(s) Oral three times a day  FLUoxetine 20 milliGRAM(s) Oral daily  gabapentin 300 milliGRAM(s) Oral three times a day  ketorolac   Injectable 15 milliGRAM(s) IV Push every 8 hours  pantoprazole    Tablet 40 milliGRAM(s) Oral daily  polyethylene glycol 3350 17 Gram(s) Oral daily  sodium chloride 0.9%. 1000 milliLiter(s) (100 mL/Hr) IV Continuous <Continuous>  thiothixene 5 milliGRAM(s) Oral <User Schedule>  thiothixene 20 milliGRAM(s) Oral at bedtime  traMADol 50 milliGRAM(s) Oral every 8 hours  trihexyphenidyl 2 milliGRAM(s) Oral two times a day    MEDICATIONS  (PRN):  acetaminophen   Tablet 650 milliGRAM(s) Oral every 6 hours PRN For Temp greater than 38 C (100.4 F)  aluminum hydroxide/magnesium hydroxide/simethicone Suspension 30 milliLiter(s) Oral four times a day PRN Indigestion  diphenhydrAMINE   Capsule 25 milliGRAM(s) Oral at bedtime PRN Insomnia  HYDROmorphone  Injectable 0.5 milliGRAM(s) IV Push every 4 hours PRN Severe Pain  magnesium hydroxide Suspension 30 milliLiter(s) Oral daily PRN Constipation  ondansetron Injectable 4 milliGRAM(s) IV Push every 6 hours PRN Nausea and/or Vomiting  oxyCODONE    IR 5 milliGRAM(s) Oral every 4 hours PRN Mild Pain  oxyCODONE    IR 10 milliGRAM(s) Oral every 4 hours PRN Moderate Pain  senna 2 Tablet(s) Oral at bedtime PRN Constipation      ROS  CONSTITUTIONAL: No weakness, fevers or chills  EYES/ENT: No visual changes;  No vertigo or throat pain   NECK: No pain or stiffness  RESPIRATORY: No cough, wheezing, hemoptysis; No shortness of breath  CARDIOVASCULAR: No chest pain or palpitations  GASTROINTESTINAL: No abdominal or epigastric pain. No nausea, vomiting, or hematemesis; No diarrhea or constipation. No melena or hematochezia.  GENITOURINARY: No dysuria, frequency or hematuria  NEUROLOGICAL: No numbness or weakness  SKIN: No itching, burning, rashes, or lesions   MUSCULOSKELETAL: right hip pain      VITALS:   T(C): 36.2 (10-19-17 @ 11:30), Max: 36.9 (10-18-17 @ 16:41)  HR: 88 (10-19-17 @ 11:45) (77 - 91)  BP: 129/64 (10-19-17 @ 11:45) (99/67 - 154/72)  RR: 16 (10-19-17 @ 11:45) (14 - 34)  SpO2: 99% (10-19-17 @ 11:45) (95% - 100%)  Wt(kg): --    PHYSICAL EXAM:  GENERAL: NAD, well-groomed, well-developed  HEAD:  Atraumatic, Normocephalic  EYES: EOMI, PERRLA, conjunctiva and sclera clear  ENMT: No tonsillar erythema, exudates, or enlargement; Moist mucous membranes, Good dentition, No lesions  NECK: Supple, No JVD, Normal thyroid  NERVOUS SYSTEM:  Alert & Oriented X3, Good concentration; Motor Strength 5/5 B/L upper and lower extremities; weakness in RLE due to pain  CHEST/LUNG: Clear to percussion bilaterally; No rales, rhonchi, wheezing, or rubs  HEART: Regular rate and rhythm; No murmurs, rubs, or gallops  ABDOMEN: Soft, Nontender, Nondistended; Bowel sounds present  EXTREMITIES:  2+ Peripheral Pulses, No clubbing, cyanosis, or edema  LYMPH: No lymphadenopathy noted  SKIN: No rashes or lesions      LABS:        CBC Full  -  ( 19 Oct 2017 11:01 )  WBC Count : 9.5 K/uL  Hemoglobin : 12.5 g/dL  Hematocrit : 35.1 %  Platelet Count - Automated : 335 K/uL  Mean Cell Volume : 89.3 fl  Mean Cell Hemoglobin : 31.7 pg  Mean Cell Hemoglobin Concentration : 35.5 gm/dL  Auto Neutrophil # : x  Auto Lymphocyte # : x  Auto Monocyte # : x  Auto Eosinophil # : x  Auto Basophil # : x  Auto Neutrophil % : x  Auto Lymphocyte % : x  Auto Monocyte % : x  Auto Eosinophil % : x  Auto Basophil % : x    10-19    137  |  98  |  14  ----------------------------<  138<H>  4.5   |  26  |  0.70    Ca    8.7      19 Oct 2017 11:01        PT/INR - ( 19 Oct 2017 04:34 )   PT: 10.4 sec;   INR: 0.96 ratio         PTT - ( 19 Oct 2017 04:34 )  PTT:28.8 sec    CAPILLARY BLOOD GLUCOSE          RADIOLOGY & ADDITIONAL TESTS:

## 2017-10-19 NOTE — PHYSICAL THERAPY INITIAL EVALUATION ADULT - ADDITIONAL COMMENTS
Pt lives in an apartment building with her spouse +3 steps to enter with B/L HR and 8 steps to negotiate once inside +L HR. Pt reports she was ambulating independently with use of straight cane secondary to hip pain. Pt states she was independent with all ADL's.

## 2017-10-19 NOTE — PHYSICAL THERAPY INITIAL EVALUATION ADULT - PERTINENT HX OF CURRENT PROBLEM, REHAB EVAL
62y F PMH of uterine cancer sp TAHBSH and radiation Nov 2014 and 4/2015 respectively. Pt is a community ambulator w/o assistive devices presents with c/o R hip pain and inability to ambulate. Pt with increasing atraumatic hip pain for 5 months. 3 months ago she started using a can to ambulate. Now s/p posterior total hip replacement on 10/19

## 2017-10-19 NOTE — BRIEF OPERATIVE NOTE - PROCEDURE
<<-----Click on this checkbox to enter Procedure Total hip arthroplasty  10/19/2017    Active  PGOLD2

## 2017-10-19 NOTE — CHART NOTE - NSCHARTNOTEFT_GEN_A_CORE
Orthopedic POC    Resting without complaints.  No Chest Pain, SOB, N/V.    T(C): 36.3 (10-19-17 @ 14:15), Max: 36.9 (10-18-17 @ 16:41)  HR: 93 (10-19-17 @ 14:15) (77 - 95)  BP: 116/75 (10-19-17 @ 14:15) (93/61 - 154/72)  RR: 18 (10-19-17 @ 14:15) (14 - 34)  SpO2: 98% (10-19-17 @ 14:15) (95% - 100%)      Exam:  Alert and [place], No Acute Distress  Card: +S1/S2, RRR  Pulm: CTAB  Abdomen soft / benign  Brown  [ n]   EXT RLE       Aquacel dressing C/D [ ]        Calves soft       (+) DF  PT;  EHL 5/5        No Sensory Deficits noted        2+ pulses    Xray:----prosthesis in good alignment                          12.5   9.5   )-----------( 335      ( 19 Oct 2017 11:01 )             35.1      10-19    137  |  98  |  14  ----------------------------<  138<H>  4.5   |  26  |  0.70      PT/INR - ( 19 Oct 2017 04:34 )   PT: 10.4 sec;   INR: 0.96 ratio         PTT - ( 19 Oct 2017 04:34 )  PTT:28.8 sec      A/P: S/p Total hip arthroplasty      -PT/OT-WBAT-  -Chk AM Labs  -DVT PPx: lovenox  -Pain Control PO/IV Pain Rx  -Continue Current Tx  --Rx reviewed      ***See Above  Paulino ROTH  Orthopedics  B: [phone]  S: [phone]

## 2017-10-19 NOTE — PROGRESS NOTE ADULT - ASSESSMENT
61 yo woman present with a hx of atraumatic hip pain.  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 MRI with CT confirm AV necrosis. Seen now s/p right THR

## 2017-10-20 LAB
ANION GAP SERPL CALC-SCNC: 12 MMOL/L — SIGNIFICANT CHANGE UP (ref 5–17)
BUN SERPL-MCNC: 13 MG/DL — SIGNIFICANT CHANGE UP (ref 7–23)
CALCIUM SERPL-MCNC: 8.3 MG/DL — LOW (ref 8.4–10.5)
CHLORIDE SERPL-SCNC: 102 MMOL/L — SIGNIFICANT CHANGE UP (ref 96–108)
CO2 SERPL-SCNC: 24 MMOL/L — SIGNIFICANT CHANGE UP (ref 22–31)
CREAT SERPL-MCNC: 0.67 MG/DL — SIGNIFICANT CHANGE UP (ref 0.5–1.3)
GLUCOSE SERPL-MCNC: 120 MG/DL — HIGH (ref 70–99)
HCT VFR BLD CALC: 29.5 % — LOW (ref 34.5–45)
HGB BLD-MCNC: 9.9 G/DL — LOW (ref 11.5–15.5)
MCHC RBC-ENTMCNC: 29.2 PG — SIGNIFICANT CHANGE UP (ref 27–34)
MCHC RBC-ENTMCNC: 33.6 GM/DL — SIGNIFICANT CHANGE UP (ref 32–36)
MCV RBC AUTO: 87 FL — SIGNIFICANT CHANGE UP (ref 80–100)
PLATELET # BLD AUTO: 287 K/UL — SIGNIFICANT CHANGE UP (ref 150–400)
POTASSIUM SERPL-MCNC: 4.3 MMOL/L — SIGNIFICANT CHANGE UP (ref 3.5–5.3)
POTASSIUM SERPL-SCNC: 4.3 MMOL/L — SIGNIFICANT CHANGE UP (ref 3.5–5.3)
RBC # BLD: 3.39 M/UL — LOW (ref 3.8–5.2)
RBC # FLD: 13.5 % — SIGNIFICANT CHANGE UP (ref 10.3–14.5)
SODIUM SERPL-SCNC: 138 MMOL/L — SIGNIFICANT CHANGE UP (ref 135–145)
WBC # BLD: 7.09 K/UL — SIGNIFICANT CHANGE UP (ref 3.8–10.5)
WBC # FLD AUTO: 7.09 K/UL — SIGNIFICANT CHANGE UP (ref 3.8–10.5)

## 2017-10-20 RX ADMIN — Medication 20 MILLIGRAM(S): at 21:08

## 2017-10-20 RX ADMIN — TRAMADOL HYDROCHLORIDE 50 MILLIGRAM(S): 50 TABLET ORAL at 21:08

## 2017-10-20 RX ADMIN — Medication 5 MILLIGRAM(S): at 06:10

## 2017-10-20 RX ADMIN — Medication 650 MILLIGRAM(S): at 13:38

## 2017-10-20 RX ADMIN — Medication 650 MILLIGRAM(S): at 20:17

## 2017-10-20 RX ADMIN — POLYETHYLENE GLYCOL 3350 17 GRAM(S): 17 POWDER, FOR SOLUTION ORAL at 11:24

## 2017-10-20 RX ADMIN — GABAPENTIN 300 MILLIGRAM(S): 400 CAPSULE ORAL at 05:32

## 2017-10-20 RX ADMIN — OXYCODONE HYDROCHLORIDE 5 MILLIGRAM(S): 5 TABLET ORAL at 11:10

## 2017-10-20 RX ADMIN — SODIUM CHLORIDE 2000 MILLILITER(S): 9 INJECTION INTRAMUSCULAR; INTRAVENOUS; SUBCUTANEOUS at 06:52

## 2017-10-20 RX ADMIN — TRAMADOL HYDROCHLORIDE 50 MILLIGRAM(S): 50 TABLET ORAL at 13:38

## 2017-10-20 RX ADMIN — PANTOPRAZOLE SODIUM 40 MILLIGRAM(S): 20 TABLET, DELAYED RELEASE ORAL at 11:23

## 2017-10-20 RX ADMIN — Medication 2 MILLIGRAM(S): at 17:06

## 2017-10-20 RX ADMIN — ENOXAPARIN SODIUM 40 MILLIGRAM(S): 100 INJECTION SUBCUTANEOUS at 17:06

## 2017-10-20 RX ADMIN — SODIUM CHLORIDE 100 MILLILITER(S): 9 INJECTION INTRAMUSCULAR; INTRAVENOUS; SUBCUTANEOUS at 05:32

## 2017-10-20 RX ADMIN — Medication 15 MILLIGRAM(S): at 05:46

## 2017-10-20 RX ADMIN — Medication 20 MILLIGRAM(S): at 11:23

## 2017-10-20 RX ADMIN — Medication 15 MILLIGRAM(S): at 05:31

## 2017-10-20 RX ADMIN — Medication 2 MILLIGRAM(S): at 06:10

## 2017-10-20 RX ADMIN — Medication 100 MILLIGRAM(S): at 21:08

## 2017-10-20 RX ADMIN — Medication 650 MILLIGRAM(S): at 02:39

## 2017-10-20 RX ADMIN — GABAPENTIN 300 MILLIGRAM(S): 400 CAPSULE ORAL at 13:38

## 2017-10-20 RX ADMIN — Medication 5 MILLIGRAM(S): at 11:23

## 2017-10-20 RX ADMIN — TRAMADOL HYDROCHLORIDE 50 MILLIGRAM(S): 50 TABLET ORAL at 13:39

## 2017-10-20 RX ADMIN — Medication 100 MILLIGRAM(S): at 05:32

## 2017-10-20 RX ADMIN — TRAMADOL HYDROCHLORIDE 50 MILLIGRAM(S): 50 TABLET ORAL at 21:38

## 2017-10-20 RX ADMIN — OXYCODONE HYDROCHLORIDE 5 MILLIGRAM(S): 5 TABLET ORAL at 10:38

## 2017-10-20 RX ADMIN — Medication 650 MILLIGRAM(S): at 08:36

## 2017-10-20 RX ADMIN — Medication 100 MILLIGRAM(S): at 11:23

## 2017-10-20 RX ADMIN — GABAPENTIN 300 MILLIGRAM(S): 400 CAPSULE ORAL at 21:08

## 2017-10-20 NOTE — PROGRESS NOTE ADULT - SUBJECTIVE AND OBJECTIVE BOX
Patient is a 62y old  Female who presents with a chief complaint of inability to ambulate (16 Oct 2017 18:39)      HPI:    MEDICATIONS  (STANDING):  acetaminophen   Tablet 650 milliGRAM(s) Oral every 6 hours  celecoxib 200 milliGRAM(s) Oral before breakfast  docusate sodium 100 milliGRAM(s) Oral three times a day  enoxaparin Injectable 40 milliGRAM(s) SubCutaneous every 24 hours  FLUoxetine 20 milliGRAM(s) Oral daily  gabapentin 300 milliGRAM(s) Oral three times a day  pantoprazole    Tablet 40 milliGRAM(s) Oral daily  polyethylene glycol 3350 17 Gram(s) Oral daily  sodium chloride 0.9%. 1000 milliLiter(s) (100 mL/Hr) IV Continuous <Continuous>  thiothixene 5 milliGRAM(s) Oral <User Schedule>  thiothixene 20 milliGRAM(s) Oral at bedtime  traMADol 50 milliGRAM(s) Oral every 8 hours  trihexyphenidyl 2 milliGRAM(s) Oral two times a day    MEDICATIONS  (PRN):  acetaminophen   Tablet 650 milliGRAM(s) Oral every 6 hours PRN For Temp greater than 38 C (100.4 F)  aluminum hydroxide/magnesium hydroxide/simethicone Suspension 30 milliLiter(s) Oral four times a day PRN Indigestion  HYDROmorphone  Injectable 0.5 milliGRAM(s) IV Push every 4 hours PRN Severe Pain  magnesium hydroxide Suspension 30 milliLiter(s) Oral daily PRN Constipation  ondansetron Injectable 4 milliGRAM(s) IV Push every 6 hours PRN Nausea and/or Vomiting  oxyCODONE    IR 5 milliGRAM(s) Oral every 4 hours PRN Mild Pain  oxyCODONE    IR 10 milliGRAM(s) Oral every 4 hours PRN Moderate Pain  senna 2 Tablet(s) Oral at bedtime PRN Constipation      Allergies    chocolate (Rash)  No Known Drug Allergies    Intolerances        REVIEW OF SYSTEM:  CONSTITUTIONAL: No fever, No change in weight, No fatigue  HEAD: No headache, No dizziness, No recent trauma  EYES: No eye pain, No visual disturbances, No discharge  ENT:  No difficulty hearing, No tinnitus, No vertigo, No sinus pain, No throat pain  NECK: No pain, No stiffness  BREASTS: No pain, No masses, No nipple discharge  RESPIRATORY: No cough, No wheezing, No chills, No hemoptysis, No shortness of breath at rest or exertional shortness of breath  CARDIOVASCULAR: No chest pain, No palpitations, No dizziness, No CHF, No arrhythmia, No cardiomegaly, No leg swelling  GASTROINTESTINAL: No abdominal, No epigastric pain. No nausea, No vomiting, No hematemesis, No diarrhea, No constipation. No melena, No hematochezia. No GERD  GENITOURINARY: No dysuria, No frequency, No hematuria, No incontinence, No nocturia, No hesitancy,  SKIN: No itching, No burning, No rashes, No lesions   LYMPH NODES: No history of enlarged glands  ENDOCRINE: No heat or cold intolerance, No hair loss. No osteoporosis, No thyroid disease  MUSCULOSKELETAL: No joint pain or swelling, No muscle, back, or extremity pain  PSYCHIATRIC: No depression, No anxiety, No mood swings, No difficulty sleeping  HEME/LYMPH: No easy bruising, No anticoagulants, No bleeding disorder, No bleeding gums  ALLERGY AND IMMUNOLOGIC: No hives, No eczema  NEUROLOGICAL: No memory loss, No loss of strength, No numbness, No tremors        VITALS:   T(C): 37.1 (10-20-17 @ 21:45), Max: 37.1 (10-20-17 @ 21:45)  HR: 92 (10-20-17 @ 21:45) (83 - 101)  BP: 100/65 (10-20-17 @ 21:45) (92/61 - 117/72)  RR: 18 (10-20-17 @ 21:45) (18 - 18)  SpO2: 99% (10-20-17 @ 21:45) (97% - 100%)  Wt(kg): --    10-19 @ 07:01  -  10-20 @ 07:00  --------------------------------------------------------  IN: 2600 mL / OUT: 1800 mL / NET: 800 mL    10-20 @ 07:01  -  10-20 @ 22:25  --------------------------------------------------------  IN: 190 mL / OUT: 850 mL / NET: -660 mL        PHYSICAL EXAM:  GENERAL: NAD, well nourished and conversant  HEAD:  Atraumatic  EYES: EOM, PERRLA, conjunctiva pink and sclera white  ENT: No tonsillar erythema, exudates, or enlargement, moist mucous membranes, good dentition, no lesions  NECK: Supple, No JVD, normal thyroid, carotids with normal upstrokes and no bruits  CHEST/LUNG: Clear to auscultation bilaterally, No rales, rhonchi, wheezing, or rubs  HEART: Regular rate and rhythm, No murmurs, rubs, or gallops  ABDOMEN: Soft, nondistended, no masses, guarding, tenderness or rebound, bowel sounds present  EXTREMITIES:  2+ Peripheral Pulses, No clubbing, cyanosis, or edema. No arthritis of shoulders, elbows, hands, hips, knees, ankles, or feet. No DJD C spine, T spine, or L/S spine  LYMPH: No lymphadenopathy noted  SKIN: No rashes or lesions  NERVOUS SYSTEM:  Alert & Oriented X3, normal cognitive function. Motor Strength 5/5 right upper and right lower.  5/5 left upper and left lower extremities, DTRs 2+ intact and symmetric    LABS:                          9.9    7.09  )-----------( 287      ( 20 Oct 2017 07:37 )             29.5     10-20    138  |  102  |  13  ----------------------------<  120<H>  4.3   |  24  |  0.67  10-19    137  |  98  |  14  ----------------------------<  138<H>  4.5   |  26  |  0.70  10-19    139  |  100  |  17  ----------------------------<  105<H>  4.4   |  28  |  0.73    Ca    8.3<L>      20 Oct 2017 07:50  Ca    8.7      19 Oct 2017 11:01  Ca    9.2      19 Oct 2017 04:34      CAPILLARY BLOOD GLUCOSE          RADIOLOGY & ADDITIONAL TESTS:      Consultant(s):    Care Discussed with Consultants/Other Providers [ ] YES  [ ] NO Patient is a 62y old  Female who presents with a chief complaint of inability to ambulate (16 Oct 2017 18:39)      HPI: Pain 2/10 right hip AFSHIN;  Incentive spirometry DVT an GI prophylaxis  Pain control adequate    MEDICATIONS  (STANDING):  acetaminophen   Tablet 650 milliGRAM(s) Oral every 6 hours  celecoxib 200 milliGRAM(s) Oral before breakfast  docusate sodium 100 milliGRAM(s) Oral three times a day  enoxaparin Injectable 40 milliGRAM(s) SubCutaneous every 24 hours  FLUoxetine 20 milliGRAM(s) Oral daily  gabapentin 300 milliGRAM(s) Oral three times a day  pantoprazole    Tablet 40 milliGRAM(s) Oral daily  polyethylene glycol 3350 17 Gram(s) Oral daily  sodium chloride 0.9%. 1000 milliLiter(s) (100 mL/Hr) IV Continuous <Continuous>  thiothixene 5 milliGRAM(s) Oral <User Schedule>  thiothixene 20 milliGRAM(s) Oral at bedtime  traMADol 50 milliGRAM(s) Oral every 8 hours  trihexyphenidyl 2 milliGRAM(s) Oral two times a day    MEDICATIONS  (PRN):  acetaminophen   Tablet 650 milliGRAM(s) Oral every 6 hours PRN For Temp greater than 38 C (100.4 F)  aluminum hydroxide/magnesium hydroxide/simethicone Suspension 30 milliLiter(s) Oral four times a day PRN Indigestion  HYDROmorphone  Injectable 0.5 milliGRAM(s) IV Push every 4 hours PRN Severe Pain  magnesium hydroxide Suspension 30 milliLiter(s) Oral daily PRN Constipation  ondansetron Injectable 4 milliGRAM(s) IV Push every 6 hours PRN Nausea and/or Vomiting  oxyCODONE    IR 5 milliGRAM(s) Oral every 4 hours PRN Mild Pain  oxyCODONE    IR 10 milliGRAM(s) Oral every 4 hours PRN Moderate Pain  senna 2 Tablet(s) Oral at bedtime PRN Constipation      Allergies    chocolate (Rash)  No Known Drug Allergies    Intolerances        REVIEW OF SYSTEM:  CONSTITUTIONAL: No fever, No change in weight, No fatigue  HEAD: No headache, No dizziness, No recent trauma  EYES: No eye pain, No visual disturbances, No discharge  ENT:  No difficulty hearing, No tinnitus, No vertigo, No sinus pain, No throat pain  NECK: No pain, No stiffness  BREASTS: No pain, No masses, No nipple discharge  RESPIRATORY: No cough, No wheezing, No chills, No hemoptysis, No shortness of breath at rest or exertional shortness of breath  CARDIOVASCULAR: No chest pain, No palpitations, No dizziness, No CHF, No arrhythmia, No cardiomegaly, No leg swelling  GASTROINTESTINAL: No abdominal, No epigastric pain. No nausea, No vomiting, No hematemesis, No diarrhea, No constipation. No melena, No hematochezia. No GERD  GENITOURINARY: No dysuria, No frequency, No hematuria, No incontinence, No nocturia, No hesitancy,  SKIN: No itching, No burning, No rashes, No lesions   LYMPH NODES: No history of enlarged glands  ENDOCRINE: No heat or cold intolerance, No hair loss. No osteoporosis, No thyroid disease  MUSCULOSKELETAL: Right hip AFSHIN  PSYCHIATRIC: No depression, No anxiety, No mood swings, No difficulty sleeping  HEME/LYMPH: No easy bruising, No anticoagulants, No bleeding disorder, No bleeding gums  ALLERGY AND IMMUNOLOGIC: No hives, No eczema  NEUROLOGICAL: No memory loss, No loss of strength, No numbness, No tremors        VITALS:   T(C): 37.1 (10-20-17 @ 21:45), Max: 37.1 (10-20-17 @ 21:45)  HR: 92 (10-20-17 @ 21:45) (83 - 101)  BP: 100/65 (10-20-17 @ 21:45) (92/61 - 117/72)  RR: 18 (10-20-17 @ 21:45) (18 - 18)  SpO2: 99% (10-20-17 @ 21:45) (97% - 100%)  Wt(kg): --    10-19 @ 07:01  -  10-20 @ 07:00  --------------------------------------------------------  IN: 2600 mL / OUT: 1800 mL / NET: 800 mL    10-20 @ 07:01  -  10-20 @ 22:25  --------------------------------------------------------  IN: 190 mL / OUT: 850 mL / NET: -660 mL        PHYSICAL EXAM:  GENERAL: NAD, well nourished and conversant  HEAD:  Atraumatic  EYES: EOM, PERRLA, conjunctiva pink and sclera white  ENT: No tonsillar erythema, exudates, or enlargement, moist mucous membranes, good dentition, no lesions  NECK: Supple, No JVD, normal thyroid, carotids with normal upstrokes and no bruits  CHEST/LUNG: Clear to auscultation bilaterally, No rales, rhonchi, wheezing, or rubs  HEART: Regular rate and rhythm, No murmurs, rubs, or gallops  ABDOMEN: Soft, nondistended, no masses, guarding, tenderness or rebound, bowel sounds present  EXTREMITIES:  2+ Peripheral Pulses, No clubbing, cyanosis, or edema.   Right hip AFSHIN pain 2-3/10  LYMPH: No lymphadenopathy noted  SKIN: No rashes or lesions  NERVOUS SYSTEM:  Alert & Oriented X3, normal cognitive function. Motor Strength 5/5 right upper and right lower.  5/5 left upper and left lower extremities, DTRs 2+ intact and symmetric    LABS:                          9.9    7.09  )-----------( 287      ( 20 Oct 2017 07:37 )             29.5     10-20    138  |  102  |  13  ----------------------------<  120<H>  4.3   |  24  |  0.67  10-19    137  |  98  |  14  ----------------------------<  138<H>  4.5   |  26  |  0.70  10-19    139  |  100  |  17  ----------------------------<  105<H>  4.4   |  28  |  0.73    Ca    8.3<L>      20 Oct 2017 07:50  Ca    8.7      19 Oct 2017 11:01  Ca    9.2      19 Oct 2017 04:34      CAPILLARY BLOOD GLUCOSE          RADIOLOGY & ADDITIONAL TESTS:      Consultant(s):    Care Discussed with Consultants/Other Providers [ ] YES  [ ] NO

## 2017-10-20 NOTE — OCCUPATIONAL THERAPY INITIAL EVALUATION ADULT - ANTICIPATED DISCHARGE DISPOSITION, OT EVAL
Home with home OT for home safety evaluation, functional mobility, aDLs and balance. Home with supervision/assist for all functional activities and ADLs.

## 2017-10-20 NOTE — OCCUPATIONAL THERAPY INITIAL EVALUATION ADULT - ADDITIONAL COMMENTS
There is no evidence of an acute periprosthetic fracture. Postsurgical changes include soft tissue edema, subcutaneous air, and cutaneous surgical staples. CT Chest: Destruction of the right femoral head with associated changes of the right acetabulum.  Heterogeneous enhancement of the right femoral vein. Deep venous thrombus is considered. Doppler (-) There is no evidence of an acute periprosthetic fracture. Postsurgical changes include soft tissue edema, subcutaneous air, and cutaneous surgical staples. CT Chest: Destruction of the right femoral head with associated changes of the right acetabulum.  Heterogeneous enhancement of the right femoral vein. Deep venous thrombus is considered. Doppler (-). s/p Total hip arthroplasty  10/19

## 2017-10-20 NOTE — PROGRESS NOTE ADULT - ASSESSMENT
63 yo woman present with a hx of atraumatic hip pain.  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 MRI with CT confirm AV necrosis. Seen now s/p right THR

## 2017-10-20 NOTE — PROGRESS NOTE ADULT - SUBJECTIVE AND OBJECTIVE BOX
10-20-17 @ 06:16    Pt comfortable.  Pain well controlled.  No overnight events.    T(C): 36.6 (10-20-17 @ 05:00), Max: 36.9 (10-19-17 @ 16:15)  HR: 93 (10-20-17 @ 05:22) (65 - 101)  BP: 105/66 (10-20-17 @ 05:22) (93/61 - 154/72)  RR: 18 (10-20-17 @ 05:00) (14 - 34)  SpO2: 97% (10-20-17 @ 05:00) (95% - 100%)    Right hip dressing clean/dry/intact.  +DF/PF.  +Distal Pulses.   Motor/Sensory function grossly intact.  Calves soft/NT with venodynes  intact.

## 2017-10-20 NOTE — OCCUPATIONAL THERAPY INITIAL EVALUATION ADULT - PERTINENT HX OF CURRENT PROBLEM, REHAB EVAL
62y F community ambulator w/o assistive devices presents c/o R hip pain and inability to ambulate. The pt has had increasing atraumatic hip pain for 5 months. 3 months ago she started using a can to ambulate. She is now unable to ambulate because of pain.   History of uterine cancer sp TAHBSH and radiation Nov 2014 and 4/2015 respectively. Xray Hip: s/p right total hip arthroplasty with acetabular augmentation screws.

## 2017-10-21 ENCOUNTER — TRANSCRIPTION ENCOUNTER (OUTPATIENT)
Age: 62
End: 2017-10-21

## 2017-10-21 DIAGNOSIS — D50.0 IRON DEFICIENCY ANEMIA SECONDARY TO BLOOD LOSS (CHRONIC): ICD-10-CM

## 2017-10-21 LAB
ANION GAP SERPL CALC-SCNC: 10 MMOL/L — SIGNIFICANT CHANGE UP (ref 5–17)
BUN SERPL-MCNC: 11 MG/DL — SIGNIFICANT CHANGE UP (ref 7–23)
CALCIUM SERPL-MCNC: 8.3 MG/DL — LOW (ref 8.4–10.5)
CHLORIDE SERPL-SCNC: 101 MMOL/L — SIGNIFICANT CHANGE UP (ref 96–108)
CO2 SERPL-SCNC: 24 MMOL/L — SIGNIFICANT CHANGE UP (ref 22–31)
CREAT SERPL-MCNC: 0.55 MG/DL — SIGNIFICANT CHANGE UP (ref 0.5–1.3)
GLUCOSE SERPL-MCNC: 111 MG/DL — HIGH (ref 70–99)
HCT VFR BLD CALC: 26.3 % — LOW (ref 34.5–45)
HGB BLD-MCNC: 8.9 G/DL — LOW (ref 11.5–15.5)
MCHC RBC-ENTMCNC: 29.4 PG — SIGNIFICANT CHANGE UP (ref 27–34)
MCHC RBC-ENTMCNC: 33.8 GM/DL — SIGNIFICANT CHANGE UP (ref 32–36)
MCV RBC AUTO: 86.8 FL — SIGNIFICANT CHANGE UP (ref 80–100)
PLATELET # BLD AUTO: 258 K/UL — SIGNIFICANT CHANGE UP (ref 150–400)
POTASSIUM SERPL-MCNC: 4 MMOL/L — SIGNIFICANT CHANGE UP (ref 3.5–5.3)
POTASSIUM SERPL-SCNC: 4 MMOL/L — SIGNIFICANT CHANGE UP (ref 3.5–5.3)
RBC # BLD: 3.03 M/UL — LOW (ref 3.8–5.2)
RBC # FLD: 14 % — SIGNIFICANT CHANGE UP (ref 10.3–14.5)
SODIUM SERPL-SCNC: 135 MMOL/L — SIGNIFICANT CHANGE UP (ref 135–145)
WBC # BLD: 8.96 K/UL — SIGNIFICANT CHANGE UP (ref 3.8–10.5)
WBC # FLD AUTO: 8.96 K/UL — SIGNIFICANT CHANGE UP (ref 3.8–10.5)

## 2017-10-21 RX ORDER — OXYCODONE HYDROCHLORIDE 5 MG/1
1 TABLET ORAL
Qty: 0 | Refills: 0 | COMMUNITY
Start: 2017-10-21

## 2017-10-21 RX ORDER — TRAMADOL HYDROCHLORIDE 50 MG/1
0 TABLET ORAL
Qty: 0 | Refills: 0 | COMMUNITY

## 2017-10-21 RX ORDER — DOCUSATE SODIUM 100 MG
1 CAPSULE ORAL
Qty: 0 | Refills: 0 | COMMUNITY
Start: 2017-10-21

## 2017-10-21 RX ORDER — ACETAMINOPHEN 500 MG
2 TABLET ORAL
Qty: 0 | Refills: 0 | COMMUNITY
Start: 2017-10-21

## 2017-10-21 RX ORDER — TRAMADOL HYDROCHLORIDE 50 MG/1
1 TABLET ORAL
Qty: 0 | Refills: 0 | COMMUNITY
Start: 2017-10-21

## 2017-10-21 RX ORDER — CELECOXIB 200 MG/1
1 CAPSULE ORAL
Qty: 0 | Refills: 0 | COMMUNITY
Start: 2017-10-21

## 2017-10-21 RX ORDER — SENNA PLUS 8.6 MG/1
2 TABLET ORAL
Qty: 0 | Refills: 0 | COMMUNITY
Start: 2017-10-21

## 2017-10-21 RX ADMIN — Medication 5 MILLIGRAM(S): at 11:49

## 2017-10-21 RX ADMIN — Medication 100 MILLIGRAM(S): at 05:33

## 2017-10-21 RX ADMIN — GABAPENTIN 300 MILLIGRAM(S): 400 CAPSULE ORAL at 13:36

## 2017-10-21 RX ADMIN — GABAPENTIN 300 MILLIGRAM(S): 400 CAPSULE ORAL at 21:27

## 2017-10-21 RX ADMIN — ENOXAPARIN SODIUM 40 MILLIGRAM(S): 100 INJECTION SUBCUTANEOUS at 17:23

## 2017-10-21 RX ADMIN — Medication 650 MILLIGRAM(S): at 08:44

## 2017-10-21 RX ADMIN — Medication 20 MILLIGRAM(S): at 11:49

## 2017-10-21 RX ADMIN — OXYCODONE HYDROCHLORIDE 5 MILLIGRAM(S): 5 TABLET ORAL at 14:06

## 2017-10-21 RX ADMIN — OXYCODONE HYDROCHLORIDE 5 MILLIGRAM(S): 5 TABLET ORAL at 13:36

## 2017-10-21 RX ADMIN — TRAMADOL HYDROCHLORIDE 50 MILLIGRAM(S): 50 TABLET ORAL at 22:15

## 2017-10-21 RX ADMIN — GABAPENTIN 300 MILLIGRAM(S): 400 CAPSULE ORAL at 05:33

## 2017-10-21 RX ADMIN — Medication 2 MILLIGRAM(S): at 17:22

## 2017-10-21 RX ADMIN — POLYETHYLENE GLYCOL 3350 17 GRAM(S): 17 POWDER, FOR SOLUTION ORAL at 11:50

## 2017-10-21 RX ADMIN — Medication 20 MILLIGRAM(S): at 21:27

## 2017-10-21 RX ADMIN — TRAMADOL HYDROCHLORIDE 50 MILLIGRAM(S): 50 TABLET ORAL at 14:06

## 2017-10-21 RX ADMIN — TRAMADOL HYDROCHLORIDE 50 MILLIGRAM(S): 50 TABLET ORAL at 06:03

## 2017-10-21 RX ADMIN — Medication 650 MILLIGRAM(S): at 13:36

## 2017-10-21 RX ADMIN — CELECOXIB 200 MILLIGRAM(S): 200 CAPSULE ORAL at 06:32

## 2017-10-21 RX ADMIN — Medication 2 MILLIGRAM(S): at 05:33

## 2017-10-21 RX ADMIN — Medication 5 MILLIGRAM(S): at 06:32

## 2017-10-21 RX ADMIN — TRAMADOL HYDROCHLORIDE 50 MILLIGRAM(S): 50 TABLET ORAL at 21:27

## 2017-10-21 RX ADMIN — Medication 650 MILLIGRAM(S): at 21:31

## 2017-10-21 RX ADMIN — TRAMADOL HYDROCHLORIDE 50 MILLIGRAM(S): 50 TABLET ORAL at 13:36

## 2017-10-21 RX ADMIN — PANTOPRAZOLE SODIUM 40 MILLIGRAM(S): 20 TABLET, DELAYED RELEASE ORAL at 11:48

## 2017-10-21 RX ADMIN — Medication 100 MILLIGRAM(S): at 13:36

## 2017-10-21 RX ADMIN — TRAMADOL HYDROCHLORIDE 50 MILLIGRAM(S): 50 TABLET ORAL at 05:33

## 2017-10-21 NOTE — DISCHARGE NOTE ADULT - CARE PROVIDER_API CALL
Ang Beavers), Orthopaedic Surgery  [address] [place] [id number]  Phone: [phone]  Fax: [phone]

## 2017-10-21 NOTE — DISCHARGE NOTE ADULT - NS AS ACTIVITY OBS
Do not drive or operate machinery/Showering allowed/Walking-Outdoors allowed/Stairs allowed/Walking-Indoors allowed/No Heavy lifting/straining/NO DRIVING until cleared to do so by Dr. Beavres./Do not make important decisions

## 2017-10-21 NOTE — PROGRESS NOTE ADULT - SUBJECTIVE AND OBJECTIVE BOX
10-21-17 @ 06:23    Pt comfortable.  Pain well controlled.  No overnight events.    T(C): 36.8 (10-21-17 @ 06:21), Max: 37.2 (10-21-17 @ 00:24)  HR: 98 (10-21-17 @ 06:21) (83 - 99)  BP: 98/62 (10-21-17 @ 06:21) (92/61 - 110/70)  RR: 16 (10-21-17 @ 06:21) (16 - 18)  SpO2: 96% (10-21-17 @ 06:21) (96% - 100%)    Right hip dressing clean/dry/intact.  +DF/PF.  +Distal Pulses.   Motor/Sensory function grossly intact.  Calves soft/NT with venodynes  intact.

## 2017-10-21 NOTE — DISCHARGE NOTE ADULT - HOSPITAL COURSE
Admit: 10/16/17  Dx: OA R Hip  Procedure: R AFSHIN  Surgeon: Ang Beavers MD    Chief Complaint/Reason for Admission: inability to ambulate	  History of Present Illness: 	  62y Female community ambulator w/o assistive devices presents c/o R hip pain and inability to ambulate. The patient has had increasing atraumatic hip pain for 5 months. 3 months ago she started using a can to ambulate. She is now unable to ambulate because of pain.  Denies numbness/tingling. Denies fever/chills. Denies pain/injury elsewhere. Denies trauma. History of uterine cancer sp TAHBSH and radiation Nov 2014 and 4/2015 respectively. denies smoking history, alcohol history, history of steroid use.    PAST MEDICAL & SURGICAL HISTORY:  GERD (gastroesophageal reflux disease)  Diverticulitis  Uterine cancer: 11/2014  Radiation  completed  4/2015  Depression with anxiety: Since 1999 controlled on Meds &amp; monthley Visit to Psychiatrist  S/P hernia repair: 1/2015  S/P YEIMY-BSO (total abdominal hysterectomy and bilateral salpingo-oophorectomy): 11/2014  Status post bunionectomy: Bilateral 1982  S/P tonsillectomy    MEDICATIONS  (STANDING):  famotidine    Tablet 20 milliGRAM(s) Oral every 12 hours  heparin  Injectable 5000 Unit(s) SubCutaneous every 8 hours    Allergies    chocolate (Rash)  No Known Drug Allergies    Intolerances            Allergies and Intolerances:        Allergies:  	No Known Drug Allergies:   	chocolate: Food, Rash    Home Medications:   * Incomplete Medication History as of 16-Oct-2017 13:49 documented in Structured Notes  · 	thiothixene 5 mg oral capsule: 1 cap(s) orally  1 Tab  AM  1 @ 11 AM  4 Tab bedtime, Last Dose Taken:    · 	trihexyphenidyl 2 mg oral tablet:  orally 2 times a day, Last Dose Taken:    · 	Percocet 5/325 oral tablet: 1 tab(s) orally every 6 hours, As Needed, Last Dose Taken:    · 	FLUoxetine 20 mg oral tablet:  orally 2 times a day, Last Dose Taken:    · 	Pepcid 20 mg oral tablet: 1 tab(s) orally 2 times a day, Last Dose Taken:    · 	traMADol: Last Dose Taken:    · 	gabapentin 300 mg oral capsule: 1 cap(s) orally 3 times a day, Last Dose Taken:    · 	Vitamin C:   · 	Vitamin D3:     .    Patient History:    Past Medical History:  Depression with anxiety  Since 1999 controlled on Meds & monthley Visit to Psychiatrist  Diverticulitis    GERD (gastroesophageal reflux disease)    Uterine cancer  11/2014  Radiation  completed  4/2015.     Past Surgical History:  S/P hernia repair  1/2015  S/P YEIMY-BSO (total abdominal hysterectomy and bilateral salpingo-oophorectomy)  11/2014  S/P tonsillectomy    Status post bunionectomy  Bilateral 1982.    Hospital Course:  Pt is a 61 y/o female admitted to [facility] with osteoarthritis of the Right Hip.  R AFSHIN was performed on 10/19 and the patient tolerated the procedure well.  Physical therapy evaluated the patient and recommended home for discharge disposition.  Pt stable for discharge home on 10/22. Admit: 10/16/17  Dx: OA R Hip  Procedure: R AFSHIN  Surgeon: Ang Beavers MD    Chief Complaint/Reason for Admission: inability to ambulate	  History of Present Illness: 	  62y Female community ambulator w/o assistive devices presents c/o R hip pain and inability to ambulate. The patient has had increasing atraumatic hip pain for 5 months. 3 months ago she started using a can to ambulate. She is now unable to ambulate because of pain.  Denies numbness/tingling. Denies fever/chills. Denies pain/injury elsewhere. Denies trauma. History of uterine cancer sp TAHBSH and radiation Nov 2014 and 4/2015 respectively. denies smoking history, alcohol history, history of steroid use.    PAST MEDICAL & SURGICAL HISTORY:  GERD (gastroesophageal reflux disease)  Diverticulitis  Uterine cancer: 11/2014  Radiation  completed  4/2015  Depression with anxiety: Since 1999 controlled on Meds &amp; monthley Visit to Psychiatrist  S/P hernia repair: 1/2015  S/P YEIMY-BSO (total abdominal hysterectomy and bilateral salpingo-oophorectomy): 11/2014  Status post bunionectomy: Bilateral 1982  S/P tonsillectomy    MEDICATIONS  (STANDING):  famotidine    Tablet 20 milliGRAM(s) Oral every 12 hours  heparin  Injectable 5000 Unit(s) SubCutaneous every 8 hours    Allergies    chocolate (Rash)  No Known Drug Allergies    Intolerances            Allergies and Intolerances:        Allergies:  	No Known Drug Allergies:   	chocolate: Food, Rash    Home Medications:   * Incomplete Medication History as of 16-Oct-2017 13:49 documented in Structured Notes  · 	thiothixene 5 mg oral capsule: 1 cap(s) orally  1 Tab  AM  1 @ 11 AM  4 Tab bedtime, Last Dose Taken:    · 	trihexyphenidyl 2 mg oral tablet:  orally 2 times a day, Last Dose Taken:    · 	Percocet 5/325 oral tablet: 1 tab(s) orally every 6 hours, As Needed, Last Dose Taken:    · 	FLUoxetine 20 mg oral tablet:  orally 2 times a day, Last Dose Taken:    · 	Pepcid 20 mg oral tablet: 1 tab(s) orally 2 times a day, Last Dose Taken:    · 	traMADol: Last Dose Taken:    · 	gabapentin 300 mg oral capsule: 1 cap(s) orally 3 times a day, Last Dose Taken:    · 	Vitamin C:   · 	Vitamin D3:     .    Patient History:    Past Medical History:  Depression with anxiety  Since 1999 controlled on Meds & monthley Visit to Psychiatrist  Diverticulitis    GERD (gastroesophageal reflux disease)    Uterine cancer  11/2014  Radiation  completed  4/2015.     Past Surgical History:  S/P hernia repair  1/2015  S/P YEIMY-BSO (total abdominal hysterectomy and bilateral salpingo-oophorectomy)  11/2014  S/P tonsillectomy    Status post bunionectomy  Bilateral 1982.    Hospital Course:  Pt is a 63 y/o female admitted to [facility] with osteoarthritis of the Right Hip.  R AFSHIN was performed on 10/19 and the patient tolerated the procedure well.  Physical therapy evaluated the patient and recommended home for discharge disposition.  Pt stable for discharge home on 10/23.    Follow up Dr Beavers in office

## 2017-10-21 NOTE — DISCHARGE NOTE ADULT - MEDICATION SUMMARY - MEDICATIONS TO TAKE
I will START or STAY ON the medications listed below when I get home from the hospital:    celecoxib 200 mg oral capsule  -- 1 cap(s) by mouth once a day (before a meal)  -- Indication: For Anti-Inflammatory    acetaminophen 325 mg oral tablet  -- 2 tab(s) by mouth every 6 hours, As needed, For Temp greater than 38 C (100.4 F)  -- Indication: For Fever    acetaminophen 325 mg oral tablet  -- 2 tab(s) by mouth every 6 hours  -- Indication: For Pain    oxyCODONE 5 mg oral tablet  -- 1-2 tab(s) by mouth every 4 to 6 hours, As Needed MDD:8  -- Indication: For Pain    traMADol 50 mg oral tablet  -- 1 tab(s) by mouth every 8 hours MDD:3  -- Indication: For Pain    Xarelto 10 mg oral tablet  -- 1 tab(s) by mouth once a day    **ANY AUTHORIZATION ISSUES CALL DR. BOOGIE [phone]** MDD:1  -- Indication: For DVT Prophylaxis    gabapentin 300 mg oral capsule  -- 1 cap(s) by mouth 3 times a day  -- Indication: For Pain    FLUoxetine 20 mg oral tablet  --  by mouth 2 times a day  -- Indication: For Depression with anxiety    trihexyphenidyl 2 mg oral tablet  --  by mouth 2 times a day  -- Indication: For Parkinson    thiothixene 5 mg oral capsule  -- 1 cap(s) by mouth  1 Tab  AM  1 @ 11 AM  4 Tab bedtime  -- Indication: For Antipsychotic    Pepcid 20 mg oral tablet  -- 1 tab(s) by mouth 2 times a day  -- Indication: For GERD (gastroesophageal reflux disease)    docusate sodium 100 mg oral capsule  -- 1 cap(s) by mouth 3 times a day  -- Indication: For Stool Softener    senna oral tablet  -- 2 tab(s) by mouth once a day (at bedtime), As needed, Constipation  -- Indication: For Mild Laxative    Vitamin C  -- Indication: For Vitamin Supplementation    Vitamin D3  -- Indication: For Vitamin Supplementation I will START or STAY ON the medications listed below when I get home from the hospital:    acetaminophen 325 mg oral tablet  -- 2 tab(s) by mouth every 6 hours, As needed, For Temp greater than 38 C (100.4 F)  -- Indication: For Fever    acetaminophen 325 mg oral tablet  -- 2 tab(s) by mouth every 6 hours  -- Indication: For Pain    oxyCODONE 5 mg oral tablet  -- 1-2 tab(s) by mouth every 4 to 6 hours, As Needed MDD:8  -- Indication: For Pain    traMADol 50 mg oral tablet  -- 1 tab(s) by mouth every 8 hours MDD:3  -- Indication: For Pain    celecoxib 200 mg oral capsule  -- 1 cap(s) by mouth once a day (before a meal) MDD:1  -- Indication: For Pain    Lovenox 40 mg/0.4 mL injectable solution  -- 40 milligram(s) injectable once a day x 21 days  -- It is very important that you take or use this exactly as directed.  Do not skip doses or discontinue unless directed by your doctor.    -- Indication: For Dvt ppx    gabapentin 300 mg oral capsule  -- 1 cap(s) by mouth 3 times a day  -- Indication: For Pain    FLUoxetine 20 mg oral tablet  --  by mouth 2 times a day  -- Indication: For Depression with anxiety    trihexyphenidyl 2 mg oral tablet  --  by mouth 2 times a day  -- Indication: For Parkinson    thiothixene 5 mg oral capsule  -- 1 cap(s) by mouth  1 Tab  AM  1 @ 11 AM  4 Tab bedtime  -- Indication: For Antipsychotic    Pepcid 20 mg oral tablet  -- 1 tab(s) by mouth 2 times a day  -- Indication: For GERD (gastroesophageal reflux disease)    docusate sodium 100 mg oral capsule  -- 1 cap(s) by mouth 3 times a day  -- Indication: For Stool Softener    senna oral tablet  -- 2 tab(s) by mouth once a day (at bedtime), As needed, Constipation  -- Indication: For Mild Laxative    Vitamin C  -- Indication: For Vitamin Supplementation    Vitamin D3  -- Indication: For Vitamin Supplementation

## 2017-10-21 NOTE — DISCHARGE NOTE ADULT - ADDITIONAL INSTRUCTIONS
Take your medications AS PRESCRIBED.  It is HIGHLY recommended that you follow up with your primary care provider within 1 month of discharge from the hospital.  Shower as needed, keep dressing on. Take your medications AS PRESCRIBED.  It is HIGHLY recommended that you follow up with your primary care provider within 1 month of discharge from the hospital.  Shower as needed, keep dressing on.  Xarelto 10mg DAILY for  6 WEEKS to prevent blood clots. Take your medications AS PRESCRIBED.    It is HIGHLY recommended that you follow up with your primary care provider within 1 month of discharge from the hospital.    Shower as needed, keep dressing on.    Xarelto 10mg DAILY for  6 WEEKS to prevent blood clots.

## 2017-10-21 NOTE — DISCHARGE NOTE ADULT - VISION (WITH CORRECTIVE LENSES IF THE PATIENT USUALLY WEARS THEM):
Normal vision: sees adequately in most situations; can see medication labels, newsprint/wears glasses when driving

## 2017-10-21 NOTE — DISCHARGE NOTE ADULT - MEDICATION SUMMARY - MEDICATIONS TO STOP TAKING
I will STOP taking the medications listed below when I get home from the hospital:  None I will STOP taking the medications listed below when I get home from the hospital:    Percocet 5/325 oral tablet  -- 1 tab(s) by mouth every 6 hours, As Needed

## 2017-10-21 NOTE — PROGRESS NOTE ADULT - SUBJECTIVE AND OBJECTIVE BOX
Patient is a 62y old  Female who presents with a chief complaint of inability to ambulate (16 Oct 2017 18:39)      HPI: Pain 2/10 right hip AFSHIN;  Incentive spirometry DVT an GI prophylaxis  Pain control adequate    MEDICATIONS  (STANDING):  acetaminophen   Tablet 650 milliGRAM(s) Oral every 6 hours  celecoxib 200 milliGRAM(s) Oral before breakfast  docusate sodium 100 milliGRAM(s) Oral three times a day  enoxaparin Injectable 40 milliGRAM(s) SubCutaneous every 24 hours  FLUoxetine 20 milliGRAM(s) Oral daily  gabapentin 300 milliGRAM(s) Oral three times a day  pantoprazole    Tablet 40 milliGRAM(s) Oral daily  polyethylene glycol 3350 17 Gram(s) Oral daily  sodium chloride 0.9%. 1000 milliLiter(s) (100 mL/Hr) IV Continuous <Continuous>  thiothixene 5 milliGRAM(s) Oral <User Schedule>  thiothixene 20 milliGRAM(s) Oral at bedtime  traMADol 50 milliGRAM(s) Oral every 8 hours  trihexyphenidyl 2 milliGRAM(s) Oral two times a day    MEDICATIONS  (PRN):  acetaminophen   Tablet 650 milliGRAM(s) Oral every 6 hours PRN For Temp greater than 38 C (100.4 F)  aluminum hydroxide/magnesium hydroxide/simethicone Suspension 30 milliLiter(s) Oral four times a day PRN Indigestion  bisacodyl Suppository 10 milliGRAM(s) Rectal daily PRN If no bowel movement by POD#2  HYDROmorphone  Injectable 0.5 milliGRAM(s) IV Push every 4 hours PRN Severe Pain  magnesium hydroxide Suspension 30 milliLiter(s) Oral daily PRN Constipation  ondansetron Injectable 4 milliGRAM(s) IV Push every 6 hours PRN Nausea and/or Vomiting  oxyCODONE    IR 5 milliGRAM(s) Oral every 4 hours PRN Mild Pain  oxyCODONE    IR 10 milliGRAM(s) Oral every 4 hours PRN Moderate Pain  senna 2 Tablet(s) Oral at bedtime PRN Constipation      Allergies    chocolate (Rash)  No Known Drug Allergies    Intolerances        REVIEW OF SYSTEM:  CONSTITUTIONAL: No fever, No change in weight, No fatigue  HEAD: No headache, No dizziness, No recent trauma  EYES: No eye pain, No visual disturbances, No discharge  ENT:  No difficulty hearing, No tinnitus, No vertigo, No sinus pain, No throat pain  NECK: No pain, No stiffness  BREASTS: No pain, No masses, No nipple discharge  RESPIRATORY: No cough, No wheezing, No chills, No hemoptysis, No shortness of breath at rest or exertional shortness of breath  CARDIOVASCULAR: No chest pain, No palpitations, No dizziness, No CHF, No arrhythmia, No cardiomegaly, No leg swelling  GASTROINTESTINAL: No abdominal, No epigastric pain. No nausea, No vomiting, No hematemesis, No diarrhea, No constipation. No melena, No hematochezia. No GERD  GENITOURINARY: No dysuria, No frequency, No hematuria, No incontinence, No nocturia, No hesitancy,  SKIN: No itching, No burning, No rashes, No lesions   LYMPH NODES: No history of enlarged glands  ENDOCRINE: No heat or cold intolerance, No hair loss. No osteoporosis, No thyroid disease  MUSCULOSKELETAL: Right hip AFSHIN  PSYCHIATRIC: No depression, No anxiety, No mood swings, No difficulty sleeping  HEME/LYMPH: No easy bruising, No anticoagulants, No bleeding disorder, No bleeding gums  ALLERGY AND IMMUNOLOGIC: No hives, No eczema  NEUROLOGICAL: No memory loss, No loss of strength, No numbness, No tremors        Vital Signs Last 24 Hrs  T(C): 36.8 (21 Oct 2017 13:02), Max: 37.2 (21 Oct 2017 00:24)  T(F): 98.3 (21 Oct 2017 13:02), Max: 98.9 (21 Oct 2017 00:24)  HR: 86 (21 Oct 2017 13:02) (83 - 105)  BP: 97/62 (21 Oct 2017 13:02) (92/61 - 113/57)  BP(mean): --  RR: 18 (21 Oct 2017 13:02) (16 - 18)  SpO2: 99% (21 Oct 2017 13:02) (96% - 100%)--------------------------------------------------------  IN: 190 mL / OUT: 850 mL / NET: -660 mL        PHYSICAL EXAM:  GENERAL: NAD, well nourished and conversant  HEAD:  Atraumatic  EYES: EOM, PERRLA, conjunctiva pink and sclera white  ENT: No tonsillar erythema, exudates, or enlargement, moist mucous membranes, good dentition, no lesions  NECK: Supple, No JVD, normal thyroid, carotids with normal upstrokes and no bruits  CHEST/LUNG: Clear to auscultation bilaterally, No rales, rhonchi, wheezing, or rubs  HEART: Regular rate and rhythm, No murmurs, rubs, or gallops  ABDOMEN: Soft, nondistended, no masses, guarding, tenderness or rebound, bowel sounds present  EXTREMITIES:  2+ Peripheral Pulses, No clubbing, cyanosis, or edema.   Right hip AFSHIN pain 2-3/10  LYMPH: No lymphadenopathy noted  SKIN: No rashes or lesions  NERVOUS SYSTEM:  Alert & Oriented X3, normal cognitive function. Motor Strength 5/5 right upper and right lower.  5/5 left upper and left lower extremities, DTRs 2+ intact and symmetric    CBC Full  -  ( 21 Oct 2017 08:48 )  WBC Count : 8.96 K/uL  Hemoglobin : 8.9 g/dL  Hematocrit : 26.3 %  Platelet Count - Automated : 258 K/uL  Mean Cell Volume : 86.8 fl  Mean Cell Hemoglobin : 29.4 pg  Mean Cell Hemoglobin Concentration : 33.8 gm/dL  Auto Neutrophil # : x  Auto Lymphocyte # : x  Auto Monocyte # : x  Auto Eosinophil # : x  Auto Basophil # : x  Auto Neutrophil % : x  Auto Lymphocyte % : x  Auto Monocyte % : x  Auto Eosinophil % : x  Auto Basophil % : x    10-21    135  |  101  |  11  ----------------------------<  111<H>  4.0   |  24  |  0.55    Ca    8.3<L>      21 Oct 2017 08:59                Consultant(s):    Care Discussed with Consultants/Other Providers [ ] YES  [ ] NO Patient is a 62y old  Female who presents with a chief complaint of inability to ambulate (16 Oct 2017 18:39)  Pain 2/10 right hip AFSHIN; for avascular necrosis.  Incentive spirometry DVT an GI prophylaxis  Pain control is adequate.  The patient is now pain free when attempting to walk.    MEDICATIONS  (STANDING):  acetaminophen   Tablet 650 milliGRAM(s) Oral every 6 hours  celecoxib 200 milliGRAM(s) Oral before breakfast  docusate sodium 100 milliGRAM(s) Oral three times a day  enoxaparin Injectable 40 milliGRAM(s) SubCutaneous every 24 hours  FLUoxetine 20 milliGRAM(s) Oral daily  gabapentin 300 milliGRAM(s) Oral three times a day  pantoprazole    Tablet 40 milliGRAM(s) Oral daily  polyethylene glycol 3350 17 Gram(s) Oral daily  sodium chloride 0.9%. 1000 milliLiter(s) (100 mL/Hr) IV Continuous <Continuous>  thiothixene 5 milliGRAM(s) Oral <User Schedule>  thiothixene 20 milliGRAM(s) Oral at bedtime  traMADol 50 milliGRAM(s) Oral every 8 hours  trihexyphenidyl 2 milliGRAM(s) Oral two times a day    MEDICATIONS  (PRN):  acetaminophen   Tablet 650 milliGRAM(s) Oral every 6 hours PRN For Temp greater than 38 C (100.4 F)  aluminum hydroxide/magnesium hydroxide/simethicone Suspension 30 milliLiter(s) Oral four times a day PRN Indigestion  bisacodyl Suppository 10 milliGRAM(s) Rectal daily PRN If no bowel movement by POD#2  HYDROmorphone  Injectable 0.5 milliGRAM(s) IV Push every 4 hours PRN Severe Pain  magnesium hydroxide Suspension 30 milliLiter(s) Oral daily PRN Constipation  ondansetron Injectable 4 milliGRAM(s) IV Push every 6 hours PRN Nausea and/or Vomiting  oxyCODONE    IR 5 milliGRAM(s) Oral every 4 hours PRN Mild Pain  oxyCODONE    IR 10 milliGRAM(s) Oral every 4 hours PRN Moderate Pain  senna 2 Tablet(s) Oral at bedtime PRN Constipation      Allergies    chocolate (Rash)  No Known Drug Allergies    Intolerances        REVIEW OF SYSTEM:  CONSTITUTIONAL: No fever, No change in weight, No fatigue  HEAD: No headache, No dizziness, No recent trauma  EYES: No eye pain, No visual disturbances, No discharge  ENT:  No difficulty hearing, No tinnitus, No vertigo, No sinus pain, No throat pain  NECK: No pain, No stiffness  BREASTS: No pain, No masses, No nipple discharge  RESPIRATORY: No cough, No wheezing, No chills, No hemoptysis, No shortness of breath at rest or exertional shortness of breath  CARDIOVASCULAR: No chest pain, No palpitations, No dizziness, No CHF, No arrhythmia, No cardiomegaly, No leg swelling  GASTROINTESTINAL: No abdominal, No epigastric pain. No nausea, No vomiting, No hematemesis, No diarrhea, No constipation. No melena, No hematochezia. No GERD  GENITOURINARY: No dysuria, No frequency, No hematuria, No incontinence, No nocturia, No hesitancy,  SKIN: No itching, No burning, No rashes, No lesions   LYMPH NODES: No history of enlarged glands  ENDOCRINE: No heat or cold intolerance, No hair loss. No osteoporosis, No thyroid disease  MUSCULOSKELETAL: Right hip AFSHIN  PSYCHIATRIC: No depression, No anxiety, No mood swings, No difficulty sleeping  HEME/LYMPH: No easy bruising, No anticoagulants, No bleeding disorder, No bleeding gums  ALLERGY AND IMMUNOLOGIC: No hives, No eczema  NEUROLOGICAL: No memory loss, No loss of strength, No numbness, No tremors        Vital Signs Last 24 Hrs  T(C): 36.8 (21 Oct 2017 13:02), Max: 37.2 (21 Oct 2017 00:24)  T(F): 98.3 (21 Oct 2017 13:02), Max: 98.9 (21 Oct 2017 00:24)  HR: 86 (21 Oct 2017 13:02) (83 - 105)  BP: 97/62 (21 Oct 2017 13:02) (92/61 - 113/57)  BP(mean): --  RR: 18 (21 Oct 2017 13:02) (16 - 18)  SpO2: 99% (21 Oct 2017 13:02) (96% - 100%)--------------------------------------------------------  IN: 190 mL / OUT: 850 mL / NET: -660 mL        PHYSICAL EXAM:  GENERAL: NAD, well nourished and conversant  HEAD:  Atraumatic  EYES: EOM, PERRLA, conjunctiva pink and sclera white  ENT: No tonsillar erythema, exudates, or enlargement, moist mucous membranes, good dentition, no lesions  NECK: Supple, No JVD, normal thyroid, carotids with normal upstrokes and no bruits  CHEST/LUNG: Clear to auscultation bilaterally, No rales, rhonchi, wheezing, or rubs  HEART: Regular rate and rhythm, No murmurs, rubs, or gallops  ABDOMEN: Soft, nondistended, no masses, guarding, tenderness or rebound, bowel sounds present  EXTREMITIES:  2+ Peripheral Pulses, No clubbing, cyanosis, or edema.   Right hip AFSHIN pain 2-3/10  LYMPH: No lymphadenopathy noted  SKIN: No rashes or lesions  NERVOUS SYSTEM:  Alert & Oriented X3, normal cognitive function. Motor Strength 5/5 right upper and right lower.  5/5 left upper and left lower extremities, DTRs 2+ intact and symmetric    CBC Full  -  ( 21 Oct 2017 08:48 )  WBC Count : 8.96 K/uL  Hemoglobin : 8.9 g/dL  Hematocrit : 26.3 %  Platelet Count - Automated : 258 K/uL  Mean Cell Volume : 86.8 fl  Mean Cell Hemoglobin : 29.4 pg  Mean Cell Hemoglobin Concentration : 33.8 gm/dL  Auto Neutrophil # : x  Auto Lymphocyte # : x  Auto Monocyte # : x  Auto Eosinophil # : x  Auto Basophil # : x  Auto Neutrophil % : x  Auto Lymphocyte % : x  Auto Monocyte % : x  Auto Eosinophil % : x  Auto Basophil % : x    10-21    135  |  101  |  11  ----------------------------<  111<H>  4.0   |  24  |  0.55    Ca    8.3<L>      21 Oct 2017 08:59                Consultant(s):    Care Discussed with Consultants/Other Providers [ ] YES  [ ] NO

## 2017-10-21 NOTE — DISCHARGE NOTE ADULT - [id number]_GEN_ALL_CORE
observation and assessment/teaching and training/rehabilitation services/medication teaching and assessment

## 2017-10-21 NOTE — DISCHARGE NOTE ADULT - PATIENT PORTAL LINK FT
“You can access the [facility] Patient Portal, offered by [facility], by registering with the following website: [email or web address]”

## 2017-10-21 NOTE — DISCHARGE NOTE ADULT - CARE PLAN
Principal Discharge DX:	Primary osteoarthritis of right hip  Goal:	Painless Ambulation; Return to Normal Activities  Instructions for follow-up, activity and diet:	Follow up with Dr. Beavers in 10-12 days.  Call his office on Monday to schedule your follow up appointment.  You may bear weight AS TOLERATED to your RIGHT lower extremity.  Posterior hip precautions.  Abduction pillow when in bed.  Keep your dressing on until your follow up visit with Dr. Beavers.  Diet as above. Principal Discharge DX:	Primary osteoarthritis of right hip  Goal:	Painless Ambulation; Return to Normal Activities  Instructions for follow-up, activity and diet:	Follow up with Dr. Beavers in 10-12 days.    Call his office on Monday to schedule your follow up appointment.    You may bear weight AS TOLERATED to your RIGHT lower extremity.  Posterior hip precautions.  Abduction pillow when in bed.  Keep your dressing on until your follow up visit with Dr. Beavers.  Diet as above. Principal Discharge DX:	Primary osteoarthritis of right hip  Goal:	Painless Ambulation; Return to Normal Activities  Instructions for follow-up, activity and diet:	Follow up with Dr. Beavers in 10-12 days.    Call his office to schedule your follow up appointment upon discharge from hospital   You may bear weight AS TOLERATED to your RIGHT lower extremity.  Posterior hip precautions.  wear Abduction pillow at all times when in bed.  Keep your dressing on until your follow up visit with Dr. Beavers.  Diet as above.

## 2017-10-22 LAB
ANION GAP SERPL CALC-SCNC: 9 MMOL/L — SIGNIFICANT CHANGE UP (ref 5–17)
BUN SERPL-MCNC: 13 MG/DL — SIGNIFICANT CHANGE UP (ref 7–23)
CALCIUM SERPL-MCNC: 8.1 MG/DL — LOW (ref 8.4–10.5)
CHLORIDE SERPL-SCNC: 101 MMOL/L — SIGNIFICANT CHANGE UP (ref 96–108)
CO2 SERPL-SCNC: 26 MMOL/L — SIGNIFICANT CHANGE UP (ref 22–31)
CREAT SERPL-MCNC: 0.51 MG/DL — SIGNIFICANT CHANGE UP (ref 0.5–1.3)
GLUCOSE SERPL-MCNC: 128 MG/DL — HIGH (ref 70–99)
HCT VFR BLD CALC: 25.5 % — LOW (ref 34.5–45)
HGB BLD-MCNC: 9 G/DL — LOW (ref 11.5–15.5)
MCHC RBC-ENTMCNC: 31.6 PG — SIGNIFICANT CHANGE UP (ref 27–34)
MCHC RBC-ENTMCNC: 35.2 GM/DL — SIGNIFICANT CHANGE UP (ref 32–36)
MCV RBC AUTO: 89.8 FL — SIGNIFICANT CHANGE UP (ref 80–100)
PLATELET # BLD AUTO: 269 K/UL — SIGNIFICANT CHANGE UP (ref 150–400)
POTASSIUM SERPL-MCNC: 3.7 MMOL/L — SIGNIFICANT CHANGE UP (ref 3.5–5.3)
POTASSIUM SERPL-SCNC: 3.7 MMOL/L — SIGNIFICANT CHANGE UP (ref 3.5–5.3)
RBC # BLD: 2.84 M/UL — LOW (ref 3.8–5.2)
RBC # FLD: 12.2 % — SIGNIFICANT CHANGE UP (ref 10.3–14.5)
SODIUM SERPL-SCNC: 136 MMOL/L — SIGNIFICANT CHANGE UP (ref 135–145)
WBC # BLD: 6.6 K/UL — SIGNIFICANT CHANGE UP (ref 3.8–10.5)
WBC # FLD AUTO: 6.6 K/UL — SIGNIFICANT CHANGE UP (ref 3.8–10.5)

## 2017-10-22 RX ORDER — RIVAROXABAN 15 MG-20MG
1 KIT ORAL
Qty: 30 | Refills: 0 | OUTPATIENT
Start: 2017-10-22

## 2017-10-22 RX ORDER — OXYCODONE HYDROCHLORIDE 5 MG/1
1 TABLET ORAL
Qty: 60 | Refills: 0 | OUTPATIENT
Start: 2017-10-22

## 2017-10-22 RX ORDER — TRAMADOL HYDROCHLORIDE 50 MG/1
1 TABLET ORAL
Qty: 21 | Refills: 0 | OUTPATIENT
Start: 2017-10-22

## 2017-10-22 RX ADMIN — Medication 100 MILLIGRAM(S): at 06:41

## 2017-10-22 RX ADMIN — TRAMADOL HYDROCHLORIDE 50 MILLIGRAM(S): 50 TABLET ORAL at 07:22

## 2017-10-22 RX ADMIN — GABAPENTIN 300 MILLIGRAM(S): 400 CAPSULE ORAL at 22:09

## 2017-10-22 RX ADMIN — CELECOXIB 200 MILLIGRAM(S): 200 CAPSULE ORAL at 07:22

## 2017-10-22 RX ADMIN — Medication 5 MILLIGRAM(S): at 11:28

## 2017-10-22 RX ADMIN — POLYETHYLENE GLYCOL 3350 17 GRAM(S): 17 POWDER, FOR SOLUTION ORAL at 11:28

## 2017-10-22 RX ADMIN — Medication 100 MILLIGRAM(S): at 13:55

## 2017-10-22 RX ADMIN — Medication 20 MILLIGRAM(S): at 22:08

## 2017-10-22 RX ADMIN — PANTOPRAZOLE SODIUM 40 MILLIGRAM(S): 20 TABLET, DELAYED RELEASE ORAL at 11:28

## 2017-10-22 RX ADMIN — Medication 20 MILLIGRAM(S): at 11:28

## 2017-10-22 RX ADMIN — TRAMADOL HYDROCHLORIDE 50 MILLIGRAM(S): 50 TABLET ORAL at 22:08

## 2017-10-22 RX ADMIN — TRAMADOL HYDROCHLORIDE 50 MILLIGRAM(S): 50 TABLET ORAL at 06:41

## 2017-10-22 RX ADMIN — Medication 2 MILLIGRAM(S): at 17:11

## 2017-10-22 RX ADMIN — CELECOXIB 200 MILLIGRAM(S): 200 CAPSULE ORAL at 06:41

## 2017-10-22 RX ADMIN — OXYCODONE HYDROCHLORIDE 5 MILLIGRAM(S): 5 TABLET ORAL at 09:48

## 2017-10-22 RX ADMIN — GABAPENTIN 300 MILLIGRAM(S): 400 CAPSULE ORAL at 06:41

## 2017-10-22 RX ADMIN — TRAMADOL HYDROCHLORIDE 50 MILLIGRAM(S): 50 TABLET ORAL at 22:48

## 2017-10-22 RX ADMIN — Medication 650 MILLIGRAM(S): at 01:53

## 2017-10-22 RX ADMIN — GABAPENTIN 300 MILLIGRAM(S): 400 CAPSULE ORAL at 13:55

## 2017-10-22 RX ADMIN — ENOXAPARIN SODIUM 40 MILLIGRAM(S): 100 INJECTION SUBCUTANEOUS at 17:11

## 2017-10-22 RX ADMIN — TRAMADOL HYDROCHLORIDE 50 MILLIGRAM(S): 50 TABLET ORAL at 14:05

## 2017-10-22 RX ADMIN — Medication 100 MILLIGRAM(S): at 22:09

## 2017-10-22 RX ADMIN — Medication 2 MILLIGRAM(S): at 06:41

## 2017-10-22 RX ADMIN — TRAMADOL HYDROCHLORIDE 50 MILLIGRAM(S): 50 TABLET ORAL at 13:55

## 2017-10-22 RX ADMIN — Medication 5 MILLIGRAM(S): at 06:41

## 2017-10-22 RX ADMIN — Medication 650 MILLIGRAM(S): at 09:48

## 2017-10-22 NOTE — PROGRESS NOTE ADULT - SUBJECTIVE AND OBJECTIVE BOX
10-22-17 @ 09:39    Pt comfortable.  Pain well controlled.  No overnight events.    T(C): 36.3 (10-22-17 @ 09:01), Max: 36.8 (10-21-17 @ 13:02)  HR: 92 (10-22-17 @ 09:01) (84 - 93)  BP: 100/62 (10-22-17 @ 09:01) (91/60 - 107/68)  RR: 16 (10-22-17 @ 09:01) (16 - 18)  SpO2: 99% (10-22-17 @ 09:01) (95% - 99%)    Right hip dressing clean/dry/intact.  +DF/PF.  +Distal Pulses.   Motor/Sensory function grossly intact.  Calves soft/NT with venodynes  intact.

## 2017-10-22 NOTE — PROGRESS NOTE ADULT - ATTENDING COMMENTS
No medical complications post-op to date and to proceed with physical therapy, as tolerated. Continues pulmonary toilet to lessen atelectasis, leg exercises as taught to lessen the risk of DVT and supervised pain medications for post-op pain control. Beginning to ambulate and doing well. No medical complications post-op to date and to proceed with physical therapy, as tolerated. Continues pulmonary toilet to lessen atelectasis, leg exercises as taught to lessen the risk of DVT and supervised pain medications for post-op pain control. I anticipate discharge to home to follow when cleared by orthopedics.

## 2017-10-22 NOTE — PROGRESS NOTE ADULT - SUBJECTIVE AND OBJECTIVE BOX
Patient is a 62y old  Female who presents with a chief complaint of inability to ambulate (16 Oct 2017 18:39)  Pain 2/10 right hip AFSHIN; for avascular necrosis.  Incentive spirometry DVT an GI prophylaxis  Pain control is adequate.  The patient is now pain free when attempting to walk.    MEDICATIONS  (STANDING):  acetaminophen   Tablet 650 milliGRAM(s) Oral every 6 hours  celecoxib 200 milliGRAM(s) Oral before breakfast  docusate sodium 100 milliGRAM(s) Oral three times a day  enoxaparin Injectable 40 milliGRAM(s) SubCutaneous every 24 hours  FLUoxetine 20 milliGRAM(s) Oral daily  gabapentin 300 milliGRAM(s) Oral three times a day  pantoprazole    Tablet 40 milliGRAM(s) Oral daily  polyethylene glycol 3350 17 Gram(s) Oral daily  sodium chloride 0.9%. 1000 milliLiter(s) (100 mL/Hr) IV Continuous <Continuous>  thiothixene 5 milliGRAM(s) Oral <User Schedule>  thiothixene 20 milliGRAM(s) Oral at bedtime  traMADol 50 milliGRAM(s) Oral every 8 hours  trihexyphenidyl 2 milliGRAM(s) Oral two times a day    MEDICATIONS  (PRN):  acetaminophen   Tablet 650 milliGRAM(s) Oral every 6 hours PRN For Temp greater than 38 C (100.4 F)  aluminum hydroxide/magnesium hydroxide/simethicone Suspension 30 milliLiter(s) Oral four times a day PRN Indigestion  bisacodyl Suppository 10 milliGRAM(s) Rectal daily PRN If no bowel movement by POD#2  HYDROmorphone  Injectable 0.5 milliGRAM(s) IV Push every 4 hours PRN Severe Pain  magnesium hydroxide Suspension 30 milliLiter(s) Oral daily PRN Constipation  ondansetron Injectable 4 milliGRAM(s) IV Push every 6 hours PRN Nausea and/or Vomiting  oxyCODONE    IR 5 milliGRAM(s) Oral every 4 hours PRN Mild Pain  oxyCODONE    IR 10 milliGRAM(s) Oral every 4 hours PRN Moderate Pain  senna 2 Tablet(s) Oral at bedtime PRN Constipation      Allergies    chocolate (Rash)  No Known Drug Allergies    Intolerances        REVIEW OF SYSTEM:  CONSTITUTIONAL: No fever, No change in weight, No fatigue  HEAD: No headache, No dizziness, No recent trauma  EYES: No eye pain, No visual disturbances, No discharge  ENT:  No difficulty hearing, No tinnitus, No vertigo, No sinus pain, No throat pain  NECK: No pain, No stiffness  BREASTS: No pain, No masses, No nipple discharge  RESPIRATORY: No cough, No wheezing, No chills, No hemoptysis, No shortness of breath at rest or exertional shortness of breath  CARDIOVASCULAR: No chest pain, No palpitations, No dizziness, No CHF, No arrhythmia, No cardiomegaly, No leg swelling  GASTROINTESTINAL: No abdominal, No epigastric pain. No nausea, No vomiting, No hematemesis, No diarrhea, No constipation. No melena, No hematochezia. No GERD  GENITOURINARY: No dysuria, No frequency, No hematuria, No incontinence, No nocturia, No hesitancy,  SKIN: No itching, No burning, No rashes, No lesions   LYMPH NODES: No history of enlarged glands  ENDOCRINE: No heat or cold intolerance, No hair loss. No osteoporosis, No thyroid disease  MUSCULOSKELETAL: Right hip AFSHIN  PSYCHIATRIC: No depression, No anxiety, No mood swings, No difficulty sleeping  HEME/LYMPH: No easy bruising, No anticoagulants, No bleeding disorder, No bleeding gums  ALLERGY AND IMMUNOLOGIC: No hives, No eczema  NEUROLOGICAL: No memory loss, No loss of strength, No numbness, No tremors        Vital Signs Last 24 Hrs  T(C): 36.8 (21 Oct 2017 13:02), Max: 37.2 (21 Oct 2017 00:24)  T(F): 98.3 (21 Oct 2017 13:02), Max: 98.9 (21 Oct 2017 00:24)  HR: 86 (21 Oct 2017 13:02) (83 - 105)  BP: 97/62 (21 Oct 2017 13:02) (92/61 - 113/57)  BP(mean): --  RR: 18 (21 Oct 2017 13:02) (16 - 18)  SpO2: 99% (21 Oct 2017 13:02) (96% - 100%)--------------------------------------------------------  IN: 190 mL / OUT: 850 mL / NET: -660 mL        PHYSICAL EXAM:  GENERAL: NAD, well nourished and conversant  HEAD:  Atraumatic  EYES: EOM, PERRLA, conjunctiva pink and sclera white  ENT: No tonsillar erythema, exudates, or enlargement, moist mucous membranes, good dentition, no lesions  NECK: Supple, No JVD, normal thyroid, carotids with normal upstrokes and no bruits  CHEST/LUNG: Clear to auscultation bilaterally, No rales, rhonchi, wheezing, or rubs  HEART: Regular rate and rhythm, No murmurs, rubs, or gallops  ABDOMEN: Soft, nondistended, no masses, guarding, tenderness or rebound, bowel sounds present  EXTREMITIES:  2+ Peripheral Pulses, No clubbing, cyanosis, or edema.   Right hip AFSHIN pain 2-3/10  LYMPH: No lymphadenopathy noted  SKIN: No rashes or lesions  NERVOUS SYSTEM:  Alert & Oriented X3, normal cognitive function. Motor Strength 5/5 right upper and right lower.  5/5 left upper and left lower extremities, DTRs 2+ intact and symmetric    CBC Full  -  ( 21 Oct 2017 08:48 )  WBC Count : 8.96 K/uL  Hemoglobin : 8.9 g/dL  Hematocrit : 26.3 %  Platelet Count - Automated : 258 K/uL  Mean Cell Volume : 86.8 fl  Mean Cell Hemoglobin : 29.4 pg  Mean Cell Hemoglobin Concentration : 33.8 gm/dL  Auto Neutrophil # : x  Auto Lymphocyte # : x  Auto Monocyte # : x  Auto Eosinophil # : x  Auto Basophil # : x  Auto Neutrophil % : x  Auto Lymphocyte % : x  Auto Monocyte % : x  Auto Eosinophil % : x  Auto Basophil % : x    10-21    135  |  101  |  11  ----------------------------<  111<H>  4.0   |  24  |  0.55    Ca    8.3<L>      21 Oct 2017 08:59                Consultant(s):    Care Discussed with Consultants/Other Providers [ ] YES  [ ] NO Patient is a 62y old  Female who presents with a chief complaint of inability to ambulate (16 Oct 2017 18:39)  Pain 2/10 right hip AFSHIN; for avascular necrosis.  Incentive spirometry DVT an GI prophylaxis  Pain control is adequate.  The patient is now pain free when attempting to walk.    MEDICATIONS  (STANDING):  celecoxib 200 milliGRAM(s) Oral before breakfast  docusate sodium 100 milliGRAM(s) Oral three times a day  enoxaparin Injectable 40 milliGRAM(s) SubCutaneous every 24 hours  FLUoxetine 20 milliGRAM(s) Oral daily  gabapentin 300 milliGRAM(s) Oral three times a day  pantoprazole    Tablet 40 milliGRAM(s) Oral daily  polyethylene glycol 3350 17 Gram(s) Oral daily  sodium chloride 0.9%. 1000 milliLiter(s) (100 mL/Hr) IV Continuous <Continuous>  thiothixene 5 milliGRAM(s) Oral <User Schedule>  thiothixene 20 milliGRAM(s) Oral at bedtime  traMADol 50 milliGRAM(s) Oral every 8 hours  trihexyphenidyl 2 milliGRAM(s) Oral two times a day    MEDICATIONS  (PRN):  acetaminophen   Tablet 650 milliGRAM(s) Oral every 6 hours PRN For Temp greater than 38 C (100.4 F)  aluminum hydroxide/magnesium hydroxide/simethicone Suspension 30 milliLiter(s) Oral four times a day PRN Indigestion  bisacodyl Suppository 10 milliGRAM(s) Rectal daily PRN If no bowel movement by POD#2  HYDROmorphone  Injectable 0.5 milliGRAM(s) IV Push every 4 hours PRN Severe Pain  magnesium hydroxide Suspension 30 milliLiter(s) Oral daily PRN Constipation  ondansetron Injectable 4 milliGRAM(s) IV Push every 6 hours PRN Nausea and/or Vomiting  oxyCODONE    IR 5 milliGRAM(s) Oral every 4 hours PRN Mild Pain  oxyCODONE    IR 10 milliGRAM(s) Oral every 4 hours PRN Moderate Pain  senna 2 Tablet(s) Oral at bedtime PRN Constipation        Allergies    chocolate (Rash)  No Known Drug Allergies    Intolerances        REVIEW OF SYSTEM:  CONSTITUTIONAL: No fever, No change in weight, No fatigue  HEAD: No headache, No dizziness, No recent trauma  EYES: No eye pain, No visual disturbances, No discharge  ENT:  No difficulty hearing, No tinnitus, No vertigo, No sinus pain, No throat pain  NECK: No pain, No stiffness  BREASTS: No pain, No masses, No nipple discharge  RESPIRATORY: No cough, No wheezing, No chills, No hemoptysis, No shortness of breath at rest or exertional shortness of breath  CARDIOVASCULAR: No chest pain, No palpitations, No dizziness, No CHF, No arrhythmia, No cardiomegaly, No leg swelling  GASTROINTESTINAL: No abdominal, No epigastric pain. No nausea, No vomiting, No hematemesis, No diarrhea, No constipation. No melena, No hematochezia. No GERD  GENITOURINARY: No dysuria, No frequency, No hematuria, No incontinence, No nocturia, No hesitancy,  SKIN: No itching, No burning, No rashes, No lesions   LYMPH NODES: No history of enlarged glands  ENDOCRINE: No heat or cold intolerance, No hair loss. No osteoporosis, No thyroid disease  MUSCULOSKELETAL: Right hip AFSHIN  PSYCHIATRIC: No depression, No anxiety, No mood swings, No difficulty sleeping  HEME/LYMPH: No easy bruising, No anticoagulants, No bleeding disorder, No bleeding gums  ALLERGY AND IMMUNOLOGIC: No hives, No eczema  NEUROLOGICAL: No memory loss, No loss of strength, No numbness, No tremors    Vital Signs Last 24 Hrs  T(C): 36.7 (22 Oct 2017 21:00), Max: 36.7 (22 Oct 2017 06:02)  T(F): 98.1 (22 Oct 2017 21:00), Max: 98.1 (22 Oct 2017 21:00)  HR: 91 (22 Oct 2017 21:00) (84 - 92)  BP: 106/60 (22 Oct 2017 21:00) (91/60 - 106/60)  BP(mean): --  RR: 18 (22 Oct 2017 21:00) (16 - 18)  SpO2: 98% (22 Oct 2017 21:00) (95% - 99%)        PHYSICAL EXAM:  GENERAL: NAD, well nourished and conversant  HEAD:  Atraumatic  EYES: EOM, PERRLA, conjunctiva pink and sclera white  ENT: No tonsillar erythema, exudates, or enlargement, moist mucous membranes, good dentition, no lesions  NECK: Supple, No JVD, normal thyroid, carotids with normal upstrokes and no bruits  CHEST/LUNG: Clear to auscultation bilaterally, No rales, rhonchi, wheezing, or rubs  HEART: Regular rate and rhythm, No murmurs, rubs, or gallops  ABDOMEN: Soft, nondistended, no masses, guarding, tenderness or rebound, bowel sounds present  EXTREMITIES:  2+ Peripheral Pulses, No clubbing, cyanosis, or edema.   Right hip AFSHIN pain 2-3/10  LYMPH: No lymphadenopathy noted  SKIN: No rashes or lesions  NERVOUS SYSTEM:  Alert & Oriented X3, normal cognitive function. Motor Strength 5/5 right upper and right lower.  5/5 left upper and left lower extremities, DTRs 2+ intact and symmetric    LABS:    CBC Full  -  ( 22 Oct 2017 06:47 )  WBC Count : 6.6 K/uL  Hemoglobin : 9.0 g/dL  Hematocrit : 25.5 %  Platelet Count - Automated : 269 K/uL  Mean Cell Volume : 89.8 fl  Mean Cell Hemoglobin : 31.6 pg  Mean Cell Hemoglobin Concentration : 35.2 gm/dL  Auto Neutrophil # : x  Auto Lymphocyte # : x  Auto Monocyte # : x  Auto Eosinophil # : x  Auto Basophil # : x  Auto Neutrophil % : x  Auto Lymphocyte % : x  Auto Monocyte % : x  Auto Eosinophil % : x  Auto Basophil % : x    10-22    136  |  101  |  13  ----------------------------<  128<H>  3.7   |  26  |  0.51    Ca    8.1<L>      22 Oct 2017 06:47                      Consultant(s):    Care Discussed with Consultants/Other Providers [ ] YES  [ ] NO Patient is a 62y old  Female who presents with a chief complaint of inability to ambulate (16 Oct 2017 18:39)  Pain 2/10 right hip AFSHIN; for avascular necrosis.  Incentive spirometry DVT an GI prophylaxis  Pain control is adequate.  The patient is now pain free when attempting to walk. Ambulation is improving significantly and the patient intends to be discharged to home, when cleared by orthopedics    MEDICATIONS  (STANDING):  celecoxib 200 milliGRAM(s) Oral before breakfast  docusate sodium 100 milliGRAM(s) Oral three times a day  enoxaparin Injectable 40 milliGRAM(s) SubCutaneous every 24 hours  FLUoxetine 20 milliGRAM(s) Oral daily  gabapentin 300 milliGRAM(s) Oral three times a day  pantoprazole    Tablet 40 milliGRAM(s) Oral daily  polyethylene glycol 3350 17 Gram(s) Oral daily  sodium chloride 0.9%. 1000 milliLiter(s) (100 mL/Hr) IV Continuous <Continuous>  thiothixene 5 milliGRAM(s) Oral <User Schedule>  thiothixene 20 milliGRAM(s) Oral at bedtime  traMADol 50 milliGRAM(s) Oral every 8 hours  trihexyphenidyl 2 milliGRAM(s) Oral two times a day    MEDICATIONS  (PRN):  acetaminophen   Tablet 650 milliGRAM(s) Oral every 6 hours PRN For Temp greater than 38 C (100.4 F)  aluminum hydroxide/magnesium hydroxide/simethicone Suspension 30 milliLiter(s) Oral four times a day PRN Indigestion  bisacodyl Suppository 10 milliGRAM(s) Rectal daily PRN If no bowel movement by POD#2  HYDROmorphone  Injectable 0.5 milliGRAM(s) IV Push every 4 hours PRN Severe Pain  magnesium hydroxide Suspension 30 milliLiter(s) Oral daily PRN Constipation  ondansetron Injectable 4 milliGRAM(s) IV Push every 6 hours PRN Nausea and/or Vomiting  oxyCODONE    IR 5 milliGRAM(s) Oral every 4 hours PRN Mild Pain  oxyCODONE    IR 10 milliGRAM(s) Oral every 4 hours PRN Moderate Pain  senna 2 Tablet(s) Oral at bedtime PRN Constipation        Allergies    chocolate (Rash)  No Known Drug Allergies      REVIEW OF SYSTEM:  CONSTITUTIONAL: No fever, No change in weight, No fatigue  HEAD: No headache, No dizziness, No recent trauma  EYES: No eye pain, No visual disturbances, No discharge  ENT:  No difficulty hearing, No tinnitus, No vertigo, No sinus pain, No throat pain  NECK: No pain, No stiffness  BREASTS: No pain, No masses, No nipple discharge  RESPIRATORY: No cough, No wheezing, No chills, No hemoptysis, No shortness of breath at rest or exertional shortness of breath  CARDIOVASCULAR: No chest pain, No palpitations, No dizziness, No CHF, No arrhythmia, No cardiomegaly, No leg swelling  GASTROINTESTINAL: No abdominal, No epigastric pain. No nausea, No vomiting, No hematemesis, No diarrhea, No constipation. No melena, No hematochezia. No GERD  GENITOURINARY: No dysuria, No frequency, No hematuria, No incontinence, No nocturia, No hesitancy,  SKIN: No itching, No burning, No rashes, No lesions   LYMPH NODES: No history of enlarged glands  ENDOCRINE: No heat or cold intolerance, No hair loss. No osteoporosis, No thyroid disease  MUSCULOSKELETAL: Right hip AFSHIN  PSYCHIATRIC: No depression, No anxiety, No mood swings, No difficulty sleeping  HEME/LYMPH: No easy bruising, No anticoagulants, No bleeding disorder, No bleeding gums  ALLERGY AND IMMUNOLOGIC: No hives, No eczema  NEUROLOGICAL: No memory loss, No loss of strength, No numbness, No tremors    Vital Signs Last 24 Hrs  T(C): 36.7 (22 Oct 2017 21:00), Max: 36.7 (22 Oct 2017 06:02)  T(F): 98.1 (22 Oct 2017 21:00), Max: 98.1 (22 Oct 2017 21:00)  HR: 91 (22 Oct 2017 21:00) (84 - 92)  BP: 106/60 (22 Oct 2017 21:00) (91/60 - 106/60)  BP(mean): --  RR: 18 (22 Oct 2017 21:00) (16 - 18)  SpO2: 98% (22 Oct 2017 21:00) (95% - 99%)        PHYSICAL EXAM:  GENERAL: NAD, well nourished and conversant  HEAD:  Atraumatic  EYES: EOM, PERRLA, conjunctiva pink and sclera white  ENT: No tonsillar erythema, exudates, or enlargement, moist mucous membranes, good dentition, no lesions  NECK: Supple, No JVD, normal thyroid, carotids with normal upstrokes and no bruits  CHEST/LUNG: Clear to auscultation bilaterally, No rales, rhonchi, wheezing, or rubs  HEART: Regular rate and rhythm, No murmurs, rubs, or gallops  ABDOMEN: Soft, nondistended, no masses, guarding, tenderness or rebound, bowel sounds present  EXTREMITIES:  2+ Peripheral Pulses, No clubbing, cyanosis, or edema.   Right hip AFSHIN pain 2-3/10  LYMPH: No lymphadenopathy noted  SKIN: No rashes or lesions  NERVOUS SYSTEM:  Alert & Oriented X3, normal cognitive function. Motor Strength 5/5 right upper and right lower.  5/5 left upper and left lower extremities, DTRs 2+ intact and symmetric    LABS:    CBC Full  -  ( 22 Oct 2017 06:47 )  WBC Count : 6.6 K/uL  Hemoglobin : 9.0 g/dL  Hematocrit : 25.5 %  Platelet Count - Automated : 269 K/uL  Mean Cell Volume : 89.8 fl  Mean Cell Hemoglobin : 31.6 pg  Mean Cell Hemoglobin Concentration : 35.2 gm/dL  Auto Neutrophil # : x  Auto Lymphocyte # : x  Auto Monocyte # : x  Auto Eosinophil # : x  Auto Basophil # : x  Auto Neutrophil % : x  Auto Lymphocyte % : x  Auto Monocyte % : x  Auto Eosinophil % : x  Auto Basophil % : x    10-22    136  |  101  |  13  ----------------------------<  128<H>  3.7   |  26  |  0.51    Ca    8.1<L>      22 Oct 2017 06:47                      Consultant(s):    Care Discussed with Consultants/Other Providers [ ] YES  [ ] NO

## 2017-10-22 NOTE — PROGRESS NOTE ADULT - ASSESSMENT
63 yo woman present with a hx of atraumatic hip pain.  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 MRI with CT confirm AV necrosis. Seen now s/p right THR 63 yo woman present with a hx of atraumatic hip pain.  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 MRI with CT confirm AV necrosis. Seen now s/p right THR. Improving daily and walking with increasing assurance,  after right total hip replacement

## 2017-10-23 VITALS
HEART RATE: 84 BPM | TEMPERATURE: 98 F | RESPIRATION RATE: 18 BRPM | OXYGEN SATURATION: 97 % | DIASTOLIC BLOOD PRESSURE: 64 MMHG | SYSTOLIC BLOOD PRESSURE: 136 MMHG

## 2017-10-23 RX ORDER — ENOXAPARIN SODIUM 100 MG/ML
40 INJECTION SUBCUTANEOUS
Qty: 8.4 | Refills: 0 | OUTPATIENT
Start: 2017-10-23 | End: 2017-11-13

## 2017-10-23 RX ORDER — CELECOXIB 200 MG/1
1 CAPSULE ORAL
Qty: 14 | Refills: 0 | OUTPATIENT
Start: 2017-10-23 | End: 2017-11-06

## 2017-10-23 RX ADMIN — Medication 5 MILLIGRAM(S): at 11:53

## 2017-10-23 RX ADMIN — TRAMADOL HYDROCHLORIDE 50 MILLIGRAM(S): 50 TABLET ORAL at 14:04

## 2017-10-23 RX ADMIN — Medication 20 MILLIGRAM(S): at 11:53

## 2017-10-23 RX ADMIN — Medication 5 MILLIGRAM(S): at 06:12

## 2017-10-23 RX ADMIN — Medication 2 MILLIGRAM(S): at 06:12

## 2017-10-23 RX ADMIN — ENOXAPARIN SODIUM 40 MILLIGRAM(S): 100 INJECTION SUBCUTANEOUS at 16:47

## 2017-10-23 RX ADMIN — Medication 100 MILLIGRAM(S): at 06:13

## 2017-10-23 RX ADMIN — CELECOXIB 200 MILLIGRAM(S): 200 CAPSULE ORAL at 07:03

## 2017-10-23 RX ADMIN — TRAMADOL HYDROCHLORIDE 50 MILLIGRAM(S): 50 TABLET ORAL at 13:29

## 2017-10-23 RX ADMIN — GABAPENTIN 300 MILLIGRAM(S): 400 CAPSULE ORAL at 06:12

## 2017-10-23 RX ADMIN — PANTOPRAZOLE SODIUM 40 MILLIGRAM(S): 20 TABLET, DELAYED RELEASE ORAL at 11:53

## 2017-10-23 RX ADMIN — GABAPENTIN 300 MILLIGRAM(S): 400 CAPSULE ORAL at 13:29

## 2017-10-23 RX ADMIN — POLYETHYLENE GLYCOL 3350 17 GRAM(S): 17 POWDER, FOR SOLUTION ORAL at 11:53

## 2017-10-23 RX ADMIN — TRAMADOL HYDROCHLORIDE 50 MILLIGRAM(S): 50 TABLET ORAL at 07:03

## 2017-10-23 RX ADMIN — CELECOXIB 200 MILLIGRAM(S): 200 CAPSULE ORAL at 06:12

## 2017-10-23 RX ADMIN — Medication 100 MILLIGRAM(S): at 13:29

## 2017-10-23 RX ADMIN — Medication 2 MILLIGRAM(S): at 17:00

## 2017-10-23 RX ADMIN — TRAMADOL HYDROCHLORIDE 50 MILLIGRAM(S): 50 TABLET ORAL at 06:12

## 2017-10-23 NOTE — PROGRESS NOTE ADULT - PROBLEM SELECTOR PROBLEM 1
Closed fracture of right hip, initial encounter

## 2017-10-23 NOTE — PROGRESS NOTE ADULT - PROBLEM SELECTOR PLAN 6
Blood loss anemia  Hgn dropped from 12.5 to 9.9  If drops below 8 considr PRBC transfusion

## 2017-10-23 NOTE — PROGRESS NOTE ADULT - PROBLEM SELECTOR PROBLEM 3
Depression with anxiety

## 2017-10-23 NOTE — PROGRESS NOTE ADULT - SUBJECTIVE AND OBJECTIVE BOX
Patient is a 62y old  Female who presents with a chief complaint of Osteoarthritis of the Right Hip - Right Total Hip Replacement (21 Oct 2017 09:49)      POST OPERATIVE DAY #:  [4 ]   Patient comfortable  No complaints    VS:  T(C): 36.8 (10-23-17 @ 04:26), Max: 36.8 (10-23-17 @ 04:26)  T(F): 98.2 (10-23-17 @ 04:26), Max: 98.2 (10-23-17 @ 04:26)  HR: 83 (10-23-17 @ 04:26) (83 - 92)  BP: 107/69 (10-23-17 @ 04:26) (97/61 - 107/69)  RR: 18 (10-23-17 @ 04:26) (16 - 18)  SpO2: 97% (10-23-17 @ 04:26) (97% - 99%)        PHYSICAL EXAM:  NAD, Alert  EXT:   RLE  [x ] Aquacel Dressing  CHANGED  No Calf Tenderness  (+) DF/PF; (+) Distal Pulses;  Sensation: No gross deficits noted  Pulses [2+  ]   B/L, PAS     LABS:                        9.0<L>  6.6   )-----------( 269      ( 22 Oct 2017 06:47 )             25.5<L>    10-22    136  |  101  |  13  ----------------------------<  128<H>  3.7   |  26  |  0.51          RADIOLOGY & ADDITIONAL TESTS:

## 2017-10-23 NOTE — PROGRESS NOTE ADULT - SUBJECTIVE AND OBJECTIVE BOX
Patient is a 62y old  Female who presents with a chief complaint of inability to ambulate (16 Oct 2017 18:39)  Pain 2/10 right hip AFSHIN; for avascular necrosis.  Incentive spirometry DVT an GI prophylaxis  Pain control is adequate.  The patient is now pain free when attempting to walk.    MEDICATIONS  (STANDING):  celecoxib 200 milliGRAM(s) Oral before breakfast  docusate sodium 100 milliGRAM(s) Oral three times a day  enoxaparin Injectable 40 milliGRAM(s) SubCutaneous every 24 hours  FLUoxetine 20 milliGRAM(s) Oral daily  gabapentin 300 milliGRAM(s) Oral three times a day  pantoprazole    Tablet 40 milliGRAM(s) Oral daily  polyethylene glycol 3350 17 Gram(s) Oral daily  sodium chloride 0.9%. 1000 milliLiter(s) (100 mL/Hr) IV Continuous <Continuous>  thiothixene 5 milliGRAM(s) Oral <User Schedule>  thiothixene 20 milliGRAM(s) Oral at bedtime  traMADol 50 milliGRAM(s) Oral every 8 hours  trihexyphenidyl 2 milliGRAM(s) Oral two times a day    MEDICATIONS  (PRN):  acetaminophen   Tablet 650 milliGRAM(s) Oral every 6 hours PRN For Temp greater than 38 C (100.4 F)  aluminum hydroxide/magnesium hydroxide/simethicone Suspension 30 milliLiter(s) Oral four times a day PRN Indigestion  bisacodyl Suppository 10 milliGRAM(s) Rectal daily PRN If no bowel movement by POD#2  HYDROmorphone  Injectable 0.5 milliGRAM(s) IV Push every 4 hours PRN Severe Pain  magnesium hydroxide Suspension 30 milliLiter(s) Oral daily PRN Constipation  ondansetron Injectable 4 milliGRAM(s) IV Push every 6 hours PRN Nausea and/or Vomiting  oxyCODONE    IR 5 milliGRAM(s) Oral every 4 hours PRN Mild Pain  oxyCODONE    IR 10 milliGRAM(s) Oral every 4 hours PRN Moderate Pain  senna 2 Tablet(s) Oral at bedtime PRN Constipation        Allergies    chocolate (Rash)  No Known Drug Allergies    Intolerances        REVIEW OF SYSTEM:  CONSTITUTIONAL: No fever, No change in weight, No fatigue  HEAD: No headache, No dizziness, No recent trauma  EYES: No eye pain, No visual disturbances, No discharge  ENT:  No difficulty hearing, No tinnitus, No vertigo, No sinus pain, No throat pain  NECK: No pain, No stiffness  BREASTS: No pain, No masses, No nipple discharge  RESPIRATORY: No cough, No wheezing, No chills, No hemoptysis, No shortness of breath at rest or exertional shortness of breath  CARDIOVASCULAR: No chest pain, No palpitations, No dizziness, No CHF, No arrhythmia, No cardiomegaly, No leg swelling  GASTROINTESTINAL: No abdominal, No epigastric pain. No nausea, No vomiting, No hematemesis, No diarrhea, No constipation. No melena, No hematochezia. No GERD  GENITOURINARY: No dysuria, No frequency, No hematuria, No incontinence, No nocturia, No hesitancy,  SKIN: No itching, No burning, No rashes, No lesions   LYMPH NODES: No history of enlarged glands  ENDOCRINE: No heat or cold intolerance, No hair loss. No osteoporosis, No thyroid disease  MUSCULOSKELETAL: Right hip AFSHIN  PSYCHIATRIC: No depression, No anxiety, No mood swings, No difficulty sleeping  HEME/LYMPH: No easy bruising, No anticoagulants, No bleeding disorder, No bleeding gums  ALLERGY AND IMMUNOLOGIC: No hives, No eczema  NEUROLOGICAL: No memory loss, No loss of strength, No numbness, No tremors    Vital Signs Last 24 Hrs  T(C): 36.7 (22 Oct 2017 21:00), Max: 36.7 (22 Oct 2017 06:02)  T(F): 98.1 (22 Oct 2017 21:00), Max: 98.1 (22 Oct 2017 21:00)  HR: 91 (22 Oct 2017 21:00) (84 - 92)  BP: 106/60 (22 Oct 2017 21:00) (91/60 - 106/60)  BP(mean): --  RR: 18 (22 Oct 2017 21:00) (16 - 18)  SpO2: 98% (22 Oct 2017 21:00) (95% - 99%)        PHYSICAL EXAM:  GENERAL: NAD, well nourished and conversant  HEAD:  Atraumatic  EYES: EOM, PERRLA, conjunctiva pink and sclera white  ENT: No tonsillar erythema, exudates, or enlargement, moist mucous membranes, good dentition, no lesions  NECK: Supple, No JVD, normal thyroid, carotids with normal upstrokes and no bruits  CHEST/LUNG: Clear to auscultation bilaterally, No rales, rhonchi, wheezing, or rubs  HEART: Regular rate and rhythm, No murmurs, rubs, or gallops  ABDOMEN: Soft, nondistended, no masses, guarding, tenderness or rebound, bowel sounds present  EXTREMITIES:  2+ Peripheral Pulses, No clubbing, cyanosis, or edema.   Right hip AFSHIN pain 2-3/10  LYMPH: No lymphadenopathy noted  SKIN: No rashes or lesions  NERVOUS SYSTEM:  Alert & Oriented X3, normal cognitive function. Motor Strength 5/5 right upper and right lower.  5/5 left upper and left lower extremities, DTRs 2+ intact and symmetric    LABS:    CBC Full  -  ( 22 Oct 2017 06:47 )  WBC Count : 6.6 K/uL  Hemoglobin : 9.0 g/dL  Hematocrit : 25.5 %  Platelet Count - Automated : 269 K/uL  Mean Cell Volume : 89.8 fl  Mean Cell Hemoglobin : 31.6 pg  Mean Cell Hemoglobin Concentration : 35.2 gm/dL  Auto Neutrophil # : x  Auto Lymphocyte # : x  Auto Monocyte # : x  Auto Eosinophil # : x  Auto Basophil # : x  Auto Neutrophil % : x  Auto Lymphocyte % : x  Auto Monocyte % : x  Auto Eosinophil % : x  Auto Basophil % : x    10-22    136  |  101  |  13  ----------------------------<  128<H>  3.7   |  26  |  0.51    Ca    8.1<L>      22 Oct 2017 06:47                      Consultant(s):    Care Discussed with Consultants/Other Providers [ ] YES  [ ] NO

## 2017-10-23 NOTE — PROGRESS NOTE ADULT - PROBLEM SELECTOR PLAN 4
Patient s/p [id number].  EOD  uncertain

## 2017-10-23 NOTE — PROGRESS NOTE ADULT - PROBLEM SELECTOR PLAN 5
No abdominal pain , watch for progressive worsening symptoms

## 2017-10-23 NOTE — PROGRESS NOTE ADULT - PROBLEM SELECTOR PROBLEM 5
Diverticulitis

## 2017-10-23 NOTE — PROGRESS NOTE ADULT - PROBLEM SELECTOR PROBLEM 4
Uterine cancer

## 2017-10-23 NOTE — PROGRESS NOTE ADULT - PROVIDER SPECIALTY LIST ADULT
Internal Medicine
Orthopedics
Internal Medicine
Internal Medicine

## 2017-10-23 NOTE — PROGRESS NOTE ADULT - PROBLEM SELECTOR PROBLEM 2
GERD (gastroesophageal reflux disease)

## 2017-10-23 NOTE — PROGRESS NOTE ADULT - ATTENDING COMMENTS
Patient DCed home   continue current meds  PO as tolerated   activity as tolerated  Patient and  aware of plan

## 2017-10-23 NOTE — PROGRESS NOTE ADULT - PROBLEM SELECTOR PLAN 2
PPI a or H2 blocker as needed
bland diet and Acid blockers
PPI a or H2 blocker as needed

## 2017-10-23 NOTE — PROGRESS NOTE ADULT - PROBLEM SELECTOR PLAN 1
tolerated procedure well  pain meds as needed  DVT and GI prophylaxis
***See Above  Paulino ROTH  Orthopedics  B: [phone]  [phone]: [phone]
Medically stable to proceed with surgery as planned.
tolerated procedure well  pain meds as needed  DVT and GI prophylaxis

## 2017-10-23 NOTE — PROGRESS NOTE ADULT - PROBLEM SELECTOR PLAN 3
stable
stable  continue home meds

## 2017-10-24 LAB
CULTURE RESULTS: SIGNIFICANT CHANGE UP
SPECIMEN SOURCE: SIGNIFICANT CHANGE UP

## 2017-10-25 LAB — SURGICAL PATHOLOGY STUDY: SIGNIFICANT CHANGE UP

## 2017-11-16 ENCOUNTER — INPATIENT (INPATIENT)
Facility: HOSPITAL | Age: 62
LOS: 2 days | Discharge: HOME | DRG: 481 | End: 2017-11-19
Attending: ORTHOPAEDIC SURGERY | Admitting: ORTHOPAEDIC SURGERY
Payer: MEDICARE

## 2017-11-16 VITALS
OXYGEN SATURATION: 99 % | SYSTOLIC BLOOD PRESSURE: 116 MMHG | HEART RATE: 94 BPM | DIASTOLIC BLOOD PRESSURE: 67 MMHG | RESPIRATION RATE: 16 BRPM

## 2017-11-16 DIAGNOSIS — S73.004A UNSPECIFIED DISLOCATION OF RIGHT HIP, INITIAL ENCOUNTER: ICD-10-CM

## 2017-11-16 DIAGNOSIS — R52 PAIN, UNSPECIFIED: ICD-10-CM

## 2017-11-16 PROBLEM — K21.9 GASTRO-ESOPHAGEAL REFLUX DISEASE WITHOUT ESOPHAGITIS: Chronic | Status: ACTIVE | Noted: 2017-10-16

## 2017-11-16 LAB
ALBUMIN SERPL ELPH-MCNC: 4.3 G/DL — SIGNIFICANT CHANGE UP (ref 3.3–5)
ALP SERPL-CCNC: 91 U/L — SIGNIFICANT CHANGE UP (ref 40–120)
ALT FLD-CCNC: 12 U/L RC — SIGNIFICANT CHANGE UP (ref 10–45)
ANION GAP SERPL CALC-SCNC: 17 MMOL/L — SIGNIFICANT CHANGE UP (ref 5–17)
APPEARANCE UR: ABNORMAL
APTT BLD: 25.3 SEC — LOW (ref 27.5–37.4)
AST SERPL-CCNC: 18 U/L — SIGNIFICANT CHANGE UP (ref 10–40)
BASOPHILS # BLD AUTO: 0 K/UL — SIGNIFICANT CHANGE UP (ref 0–0.2)
BASOPHILS NFR BLD AUTO: 0.2 % — SIGNIFICANT CHANGE UP (ref 0–2)
BILIRUB SERPL-MCNC: 0.3 MG/DL — SIGNIFICANT CHANGE UP (ref 0.2–1.2)
BILIRUB UR-MCNC: NEGATIVE — SIGNIFICANT CHANGE UP
BLD GP AB SCN SERPL QL: NEGATIVE — SIGNIFICANT CHANGE UP
BUN SERPL-MCNC: 12 MG/DL — SIGNIFICANT CHANGE UP (ref 7–23)
CALCIUM SERPL-MCNC: 9.4 MG/DL — SIGNIFICANT CHANGE UP (ref 8.4–10.5)
CHLORIDE SERPL-SCNC: 101 MMOL/L — SIGNIFICANT CHANGE UP (ref 96–108)
CO2 SERPL-SCNC: 21 MMOL/L — LOW (ref 22–31)
COLOR SPEC: SIGNIFICANT CHANGE UP
CREAT SERPL-MCNC: 0.65 MG/DL — SIGNIFICANT CHANGE UP (ref 0.5–1.3)
DIFF PNL FLD: NEGATIVE — SIGNIFICANT CHANGE UP
EOSINOPHIL # BLD AUTO: 0 K/UL — SIGNIFICANT CHANGE UP (ref 0–0.5)
EOSINOPHIL NFR BLD AUTO: 0.3 % — SIGNIFICANT CHANGE UP (ref 0–6)
GLUCOSE SERPL-MCNC: 100 MG/DL — HIGH (ref 70–99)
GLUCOSE UR QL: NEGATIVE — SIGNIFICANT CHANGE UP
HCG UR QL: NEGATIVE — SIGNIFICANT CHANGE UP
HCT VFR BLD CALC: 35.1 % — SIGNIFICANT CHANGE UP (ref 34.5–45)
HGB BLD-MCNC: 11.9 G/DL — SIGNIFICANT CHANGE UP (ref 11.5–15.5)
INR BLD: 0.98 RATIO — SIGNIFICANT CHANGE UP (ref 0.88–1.16)
KETONES UR-MCNC: NEGATIVE — SIGNIFICANT CHANGE UP
LEUKOCYTE ESTERASE UR-ACNC: NEGATIVE — SIGNIFICANT CHANGE UP
LYMPHOCYTES # BLD AUTO: 0.5 K/UL — LOW (ref 1–3.3)
LYMPHOCYTES # BLD AUTO: 4.8 % — LOW (ref 13–44)
MCHC RBC-ENTMCNC: 30.1 PG — SIGNIFICANT CHANGE UP (ref 27–34)
MCHC RBC-ENTMCNC: 34 GM/DL — SIGNIFICANT CHANGE UP (ref 32–36)
MCV RBC AUTO: 88.5 FL — SIGNIFICANT CHANGE UP (ref 80–100)
MONOCYTES # BLD AUTO: 0.9 K/UL — SIGNIFICANT CHANGE UP (ref 0–0.9)
MONOCYTES NFR BLD AUTO: 8.6 % — SIGNIFICANT CHANGE UP (ref 2–14)
NEUTROPHILS # BLD AUTO: 9.3 K/UL — HIGH (ref 1.8–7.4)
NEUTROPHILS NFR BLD AUTO: 86.1 % — HIGH (ref 43–77)
NITRITE UR-MCNC: NEGATIVE — SIGNIFICANT CHANGE UP
PH UR: 7 — SIGNIFICANT CHANGE UP (ref 5–8)
PLATELET # BLD AUTO: 343 K/UL — SIGNIFICANT CHANGE UP (ref 150–400)
POTASSIUM SERPL-MCNC: 4.3 MMOL/L — SIGNIFICANT CHANGE UP (ref 3.5–5.3)
POTASSIUM SERPL-SCNC: 4.3 MMOL/L — SIGNIFICANT CHANGE UP (ref 3.5–5.3)
PROT SERPL-MCNC: 6.4 G/DL — SIGNIFICANT CHANGE UP (ref 6–8.3)
PROT UR-MCNC: NEGATIVE — SIGNIFICANT CHANGE UP
PROTHROM AB SERPL-ACNC: 10.7 SEC — SIGNIFICANT CHANGE UP (ref 9.8–12.7)
RBC # BLD: 3.97 M/UL — SIGNIFICANT CHANGE UP (ref 3.8–5.2)
RBC # FLD: 13.1 % — SIGNIFICANT CHANGE UP (ref 10.3–14.5)
RH IG SCN BLD-IMP: NEGATIVE — SIGNIFICANT CHANGE UP
SODIUM SERPL-SCNC: 139 MMOL/L — SIGNIFICANT CHANGE UP (ref 135–145)
SP GR SPEC: 1.01 — LOW (ref 1.01–1.02)
UROBILINOGEN FLD QL: NEGATIVE — SIGNIFICANT CHANGE UP
WBC # BLD: 10.8 K/UL — HIGH (ref 3.8–10.5)
WBC # FLD AUTO: 10.8 K/UL — HIGH (ref 3.8–10.5)

## 2017-11-16 PROCEDURE — 73502 X-RAY EXAM HIP UNI 2-3 VIEWS: CPT | Mod: 26,77,RT

## 2017-11-16 PROCEDURE — 99153 MOD SED SAME PHYS/QHP EA: CPT | Mod: 76

## 2017-11-16 PROCEDURE — 72170 X-RAY EXAM OF PELVIS: CPT | Mod: 26,59

## 2017-11-16 PROCEDURE — 73502 X-RAY EXAM HIP UNI 2-3 VIEWS: CPT | Mod: 26,RT

## 2017-11-16 PROCEDURE — 72192 CT PELVIS W/O DYE: CPT | Mod: 26

## 2017-11-16 PROCEDURE — 99285 EMERGENCY DEPT VISIT HI MDM: CPT | Mod: 25

## 2017-11-16 PROCEDURE — 76377 3D RENDER W/INTRP POSTPROCES: CPT | Mod: 26

## 2017-11-16 PROCEDURE — 71010: CPT | Mod: 26

## 2017-11-16 PROCEDURE — 73552 X-RAY EXAM OF FEMUR 2/>: CPT | Mod: 26,RT

## 2017-11-16 PROCEDURE — 93010 ELECTROCARDIOGRAM REPORT: CPT | Mod: 59

## 2017-11-16 PROCEDURE — 99152 MOD SED SAME PHYS/QHP 5/>YRS: CPT

## 2017-11-16 RX ORDER — OXYCODONE HYDROCHLORIDE 5 MG/1
10 TABLET ORAL
Qty: 0 | Refills: 0 | Status: DISCONTINUED | OUTPATIENT
Start: 2017-11-16 | End: 2017-11-17

## 2017-11-16 RX ORDER — THIOTHIXENE 5 MG
20 CAPSULE ORAL AT BEDTIME
Qty: 0 | Refills: 0 | Status: DISCONTINUED | OUTPATIENT
Start: 2017-11-16 | End: 2017-11-17

## 2017-11-16 RX ORDER — TRIHEXYPHENIDYL HCL 2 MG
2 TABLET ORAL
Qty: 0 | Refills: 0 | Status: DISCONTINUED | OUTPATIENT
Start: 2017-11-16 | End: 2017-11-17

## 2017-11-16 RX ORDER — HEPARIN SODIUM 5000 [USP'U]/ML
5000 INJECTION INTRAVENOUS; SUBCUTANEOUS ONCE
Qty: 0 | Refills: 0 | Status: COMPLETED | OUTPATIENT
Start: 2017-11-16 | End: 2017-11-16

## 2017-11-16 RX ORDER — ACETAMINOPHEN 500 MG
1000 TABLET ORAL ONCE
Qty: 0 | Refills: 0 | Status: COMPLETED | OUTPATIENT
Start: 2017-11-16 | End: 2017-11-16

## 2017-11-16 RX ORDER — ACETAMINOPHEN 500 MG
650 TABLET ORAL EVERY 6 HOURS
Qty: 0 | Refills: 0 | Status: DISCONTINUED | OUTPATIENT
Start: 2017-11-16 | End: 2017-11-17

## 2017-11-16 RX ORDER — GABAPENTIN 400 MG/1
300 CAPSULE ORAL THREE TIMES A DAY
Qty: 0 | Refills: 0 | Status: DISCONTINUED | OUTPATIENT
Start: 2017-11-16 | End: 2017-11-17

## 2017-11-16 RX ORDER — MIDAZOLAM HYDROCHLORIDE 1 MG/ML
2 INJECTION, SOLUTION INTRAMUSCULAR; INTRAVENOUS ONCE
Qty: 0 | Refills: 0 | Status: DISCONTINUED | OUTPATIENT
Start: 2017-11-16 | End: 2017-11-16

## 2017-11-16 RX ORDER — SENNA PLUS 8.6 MG/1
2 TABLET ORAL AT BEDTIME
Qty: 0 | Refills: 0 | Status: DISCONTINUED | OUTPATIENT
Start: 2017-11-16 | End: 2017-11-17

## 2017-11-16 RX ORDER — FLUOXETINE HCL 10 MG
20 CAPSULE ORAL AT BEDTIME
Qty: 0 | Refills: 0 | Status: DISCONTINUED | OUTPATIENT
Start: 2017-11-16 | End: 2017-11-17

## 2017-11-16 RX ORDER — FLUOXETINE HCL 10 MG
20 CAPSULE ORAL
Qty: 0 | Refills: 0 | Status: DISCONTINUED | OUTPATIENT
Start: 2017-11-16 | End: 2017-11-16

## 2017-11-16 RX ORDER — DOCUSATE SODIUM 100 MG
100 CAPSULE ORAL THREE TIMES A DAY
Qty: 0 | Refills: 0 | Status: DISCONTINUED | OUTPATIENT
Start: 2017-11-16 | End: 2017-11-17

## 2017-11-16 RX ORDER — OXYCODONE HYDROCHLORIDE 5 MG/1
5 TABLET ORAL
Qty: 0 | Refills: 0 | Status: DISCONTINUED | OUTPATIENT
Start: 2017-11-16 | End: 2017-11-17

## 2017-11-16 RX ORDER — MORPHINE SULFATE 50 MG/1
2 CAPSULE, EXTENDED RELEASE ORAL EVERY 4 HOURS
Qty: 0 | Refills: 0 | Status: DISCONTINUED | OUTPATIENT
Start: 2017-11-16 | End: 2017-11-17

## 2017-11-16 RX ORDER — MORPHINE SULFATE 50 MG/1
4 CAPSULE, EXTENDED RELEASE ORAL ONCE
Qty: 0 | Refills: 0 | Status: DISCONTINUED | OUTPATIENT
Start: 2017-11-16 | End: 2017-11-16

## 2017-11-16 RX ORDER — PROPOFOL 10 MG/ML
100 INJECTION, EMULSION INTRAVENOUS ONCE
Qty: 0 | Refills: 0 | Status: COMPLETED | OUTPATIENT
Start: 2017-11-16 | End: 2017-11-16

## 2017-11-16 RX ORDER — FAMOTIDINE 10 MG/ML
20 INJECTION INTRAVENOUS
Qty: 0 | Refills: 0 | Status: DISCONTINUED | OUTPATIENT
Start: 2017-11-16 | End: 2017-11-17

## 2017-11-16 RX ORDER — FENTANYL CITRATE 50 UG/ML
200 INJECTION INTRAVENOUS ONCE
Qty: 0 | Refills: 0 | Status: DISCONTINUED | OUTPATIENT
Start: 2017-11-16 | End: 2017-11-16

## 2017-11-16 RX ORDER — SODIUM CHLORIDE 9 MG/ML
1000 INJECTION, SOLUTION INTRAVENOUS
Qty: 0 | Refills: 0 | Status: DISCONTINUED | OUTPATIENT
Start: 2017-11-16 | End: 2017-11-17

## 2017-11-16 RX ORDER — THIOTHIXENE 5 MG
5 CAPSULE ORAL
Qty: 0 | Refills: 0 | Status: DISCONTINUED | OUTPATIENT
Start: 2017-11-16 | End: 2017-11-17

## 2017-11-16 RX ORDER — FLUOXETINE HCL 10 MG
10 CAPSULE ORAL DAILY
Qty: 0 | Refills: 0 | Status: DISCONTINUED | OUTPATIENT
Start: 2017-11-17 | End: 2017-11-17

## 2017-11-16 RX ADMIN — Medication 20 MILLIGRAM(S): at 23:46

## 2017-11-16 RX ADMIN — Medication 400 MILLIGRAM(S): at 22:40

## 2017-11-16 RX ADMIN — MORPHINE SULFATE 4 MILLIGRAM(S): 50 CAPSULE, EXTENDED RELEASE ORAL at 13:57

## 2017-11-16 RX ADMIN — FENTANYL CITRATE 200 MICROGRAM(S): 50 INJECTION INTRAVENOUS at 16:20

## 2017-11-16 RX ADMIN — Medication 1000 MILLIGRAM(S): at 23:10

## 2017-11-16 RX ADMIN — HEPARIN SODIUM 5000 UNIT(S): 5000 INJECTION INTRAVENOUS; SUBCUTANEOUS at 23:46

## 2017-11-16 RX ADMIN — MIDAZOLAM HYDROCHLORIDE 2 MILLIGRAM(S): 1 INJECTION, SOLUTION INTRAMUSCULAR; INTRAVENOUS at 16:02

## 2017-11-16 RX ADMIN — MIDAZOLAM HYDROCHLORIDE 2 MILLIGRAM(S): 1 INJECTION, SOLUTION INTRAMUSCULAR; INTRAVENOUS at 16:26

## 2017-11-16 RX ADMIN — FENTANYL CITRATE 200 MICROGRAM(S): 50 INJECTION INTRAVENOUS at 15:56

## 2017-11-16 RX ADMIN — GABAPENTIN 300 MILLIGRAM(S): 400 CAPSULE ORAL at 23:46

## 2017-11-16 RX ADMIN — MORPHINE SULFATE 4 MILLIGRAM(S): 50 CAPSULE, EXTENDED RELEASE ORAL at 14:54

## 2017-11-16 RX ADMIN — PROPOFOL 100 MILLIGRAM(S): 10 INJECTION, EMULSION INTRAVENOUS at 17:26

## 2017-11-16 RX ADMIN — Medication 2 MILLIGRAM(S): at 23:46

## 2017-11-16 NOTE — ED PROCEDURE NOTE - PROCEDURE ADDITIONAL DETAILS
Patient given a total of Fentanyl 200mcg and versed 6mg and orthopedic unable to reduce hip dislocation

## 2017-11-16 NOTE — PATIENT PROFILE ADULT. - PROVIDER NOTIFICATION
PLEASE PLACE OR CONFIRM PENDED ORDERS FOR UPCOMING LAB APPOINTMENT ON 5/16/2017.  THANK YOU.     Declines

## 2017-11-16 NOTE — ED ADULT NURSE NOTE - OBJECTIVE STATEMENT
62 year old female presented to ED via EMS with c/o of right hip pain starting today. Pt had hip surgery 2 weeks go. Pt was standing and states 'out of no where I started getting right hip pain'. No deformity at the site, limited ROM, no numbness/tingling, no fever, cough chills, dizziness. Neuro intact. Pt states pain is 7/10 on verbal pain scale. Will continue to monitor and assess while offering support and reassurance.

## 2017-11-16 NOTE — ED ADULT NURSE NOTE - CAS EDN DISCHARGE ASSESSMENT
Patient baseline mental status/Alert and oriented to person, place and time/Symptoms improved/Dressing clean and dry/Awake

## 2017-11-16 NOTE — ED PROVIDER NOTE - LOWER EXTREMITY EXAM, RIGHT
TENDERNESS/LIMITED ROM/REDUCED STRENGTH/Healing scar noted of trochanteric area of right hip. No surrounding erythema or swelling. Tenderness noted to palpation over the greater trochanter of left hip. Pain reported with passive flexion of right leg at the hip. Pain with passive internal rotation and log roll of right leg. Pt unable to actively move right leg 2/2 pain.

## 2017-11-16 NOTE — CONSULT NOTE ADULT - ASSESSMENT
63 yo woman s/p right THR, present with dislocation of thr right hip. schedued for revision of the hip

## 2017-11-16 NOTE — ED PROVIDER NOTE - MEDICAL DECISION MAKING DETAILS
Attending MD Asencio: 63 yo female sp total right hip replacement by Dr. Beavers 3 weeks ago presents with dislocated right hip.  RLE shortened but neurovascular intact.  Attempted reduction with sedation x 3 with orthopedics and patient admitted to orthopedics for operative reduction.

## 2017-11-16 NOTE — ED PROVIDER NOTE - OBJECTIVE STATEMENT
Patient is a 63 yo female s/p total right hip arthroplasty 3 weeks prior presenting with atraumatic right hip pain that began this morning while she was bending over. She states while bending she felt and heard a "pop" in the right hip and immediately felt a sharp pain radiate down her leg and into her groin. she maintained her balance and sat back on her bed. She was unable to ambulate immediately after the injury. She denies pain at rest but states the pain is worsened with movement. She is having numbness and tingling down her right leg into her toes. She states her stitches were removed last week and she has been ambulating with her cane without trouble or pain. She has been following up regularly with Dr. Baron who performed the surgery. She denies any fever or chills, LOC, shortness of breath, or chest pain. She is not on any anticoagulation. Patient is a 63 yo female s/p total right hip arthroplasty 3 weeks prior presenting with atraumatic right hip pain that began this morning while she was bending over. She states while bending she felt and heard a "pop" in the right hip and immediately felt a sharp pain radiate down her leg and into her groin. she maintained her balance and sat back on her bed. She was unable to ambulate immediately after the injury. She denies pain at rest but states the pain is worsened with movement. She is having numbness and tingling down her right leg into her toes. She states her stitches were removed last week and she has been ambulating with her cane without trouble or pain. She has been following up regularly with Dr. Ang Beavers who performed the surgery. She denies any fever or chills, LOC, shortness of breath, or chest pain. She is not on any anticoagulation.

## 2017-11-16 NOTE — CONSULT NOTE ADULT - SUBJECTIVE AND OBJECTIVE BOX
Patient is a 61 yo woman s/p recent THR on the right. pT was doing well until today when she felt sever pain in the right hip after ambulating. Patient came to [facility] for further eval. Patient was found to have a hip dislocation. Attempted to relocate hip ER but was unable. Patient schedule for revision of THR    PAST MEDICAL & SURGICAL HISTORY:  GERD (gastroesophageal reflux disease)  Diverticulitis  Uterine cancer: 2014  Radiation  completed  2015  Depression with anxiety: Since  controlled on Meds &amp; [place] Visit to Psychiatrist  S/P hernia repair: 2015  S/P YEIMY-BSO (total abdominal hysterectomy and bilateral salpingo-oophorectomy): 2014  Status post bunionectomy: Bilateral   S/P tonsillectomy    allergies: chocolate    MEDICATIONS  (STANDING):  famotidine    Tablet 20 milliGRAM(s) Oral every 12 hours  FLUoxetine 20 milliGRAM(s) Oral daily  gabapentin 300 milliGRAM(s) Oral three times a day  heparin  Injectable 5000 Unit(s) SubCutaneous every 8 hours  thiothixene 5 milliGRAM(s) Oral <User Schedule>  thiothixene 20 milliGRAM(s) Oral at bedtime  trihexyphenidyl 2 milliGRAM(s) Oral two times a day    MEDICATIONS  (PRN):  acetaminophen   Tablet 650 milliGRAM(s) Oral every 6 hours PRN For Temp greater than 38 C (100.4 F)  acetaminophen   Tablet. 325 milliGRAM(s) Oral every 4 hours PRN Mild Pain (1 - 3)  diphenhydrAMINE   Capsule 25 milliGRAM(s) Oral at bedtime PRN Insomnia  HYDROmorphone  Injectable 0.5 milliGRAM(s) IV Push every 4 hours PRN breakthrough  oxyCODONE    IR 10 milliGRAM(s) Oral every 4 hours PRN Severe Pain (7 - 10)  oxyCODONE    IR 5 milliGRAM(s) Oral every 4 hours PRN Moderate Pain (4 - 6)    Soc HX:  Tobacco: Neg  ETOH: Neg  Drugs: neg    Family Hx:  as per my conversation with the patient, non contributory    CONSTITUTIONAL: No weakness, fevers or chills  EYES/ENT: No visual changes;  No vertigo or throat pain   NECK: No pain or stiffness  RESPIRATORY: No cough, wheezing, hemoptysis; No shortness of breath  CARDIOVASCULAR: No chest pain or palpitations  GASTROINTESTINAL: No abdominal or epigastric pain. No nausea, vomiting, or hematemesis; No diarrhea or constipation. No melena or hematochezia.  GENITOURINARY: No dysuria, frequency or hematuria  NEUROLOGICAL: No numbness or weakness  SKIN: No itching, burning, rashes, or lesions   MUSCULOSKELETAL: right hip pain    INTERVAL HPI/OVERNIGHT EVENTS:  T(C): 36.6 (10-16-17 @ 21:50), Max: 36.6 (10-16-17 @ 18:55)  HR: 79 (10-16-17 @ 21:50) (79 - 88)  BP: 128/82 (10-16-17 @ 21:50) (128/82 - 147/81)  RR: 16 (10-16-17 @ 21:50) (16 - 18)  SpO2: 95% (10-16-17 @ 21:50) (95% - 100%)  Wt(kg): --  I&O's Summary    16 Oct 2017 07:01  -  16 Oct 2017 22:53  --------------------------------------------------------  IN: 280 mL / OUT: 0 mL / NET: 280 mL        PHYSICAL EXAM:  GENERAL: NAD, well-groomed, well-developed  HEAD:  Atraumatic, Normocephalic  EYES: EOMI, PERRLA, conjunctiva and sclera clear  ENMT: No tonsillar erythema, exudates, or enlargement; Moist mucous membranes, Good dentition, No lesions  NECK: Supple, No JVD, Normal thyroid  NERVOUS SYSTEM:  Alert & Oriented X3, Good concentration; Motor Strength 5/5 B/L upper and lower extremities; weakness in RLE due to pain  CHEST/LUNG: Clear to percussion bilaterally; No rales, rhonchi, wheezing, or rubs  HEART: Regular rate and rhythm; No murmurs, rubs, or gallops  ABDOMEN: Soft, Nontender, Nondistended; Bowel sounds present  EXTREMITIES:  2+ Peripheral Pulses, No clubbing, cyanosis, or edema  LYMPH: No lymphadenopathy noted  SKIN: No rashes or lesions        LABS:                        13.3   9.0   )-----------( 384      ( 16 Oct 2017 15:51 )             38.2     10-16    136  |  97  |  15  ----------------------------<  104<H>  3.9   |  24  |  0.75    Ca    9.8      16 Oct 2017 15:51    TPro  6.4  /  Alb  x   /  TBili  x   /  DBili  x   /  AST  x   /  ALT  x   /  AlkPhos  x   10-16    PT/INR - ( 16 Oct 2017 15:51 )   PT: 11.0 sec;   INR: 1.01 ratio         PTT - ( 16 Oct 2017 15:51 )  PTT:28.3 sec  Urinalysis Basic - ( 16 Oct 2017 16:10 )    Color: PL Yellow / Appearance: Clear / S.007 / pH: x  Gluc: x / Ketone: Moderate  / Bili: Negative / Urobili: Negative   Blood: x / Protein: Negative / Nitrite: Negative   Leuk Esterase: Negative / RBC: x / WBC x   Sq Epi: x / Non Sq Epi: x / Bacteria: x      CAPILLARY BLOOD GLUCOSE      EKG:  pending      Urinalysis Basic - ( 16 Oct 2017 16:10 )    Color: PL Yellow / Appearance: Clear / S.007 / pH: x  Gluc: x / Ketone: Moderate  / Bili: Negative / Urobili: Negative   Blood: x / Protein: Negative / Nitrite: Negative   Leuk Esterase: Negative / RBC: x / WBC x   Sq Epi: x / Non Sq Epi: x / Bacteria: x        Radiology reports:

## 2017-11-16 NOTE — ED ADULT NURSE NOTE - CHPI ED SYMPTOMS NEG
no vomiting/no chills/no decreased eating/drinking/no fever/no numbness/no tingling/no dizziness/no weakness/no nausea

## 2017-11-16 NOTE — ED PROVIDER NOTE - ATTENDING CONTRIBUTION TO CARE
Attending MD Asencio:  I personally have seen and examined this patient.  PA note reviewed and agree on plan of care and except where noted.  See MDM for details.

## 2017-11-16 NOTE — ED ADULT NURSE REASSESSMENT NOTE - NS ED NURSE REASSESS COMMENT FT1
Pt received conscious sedation at 1556, consent placed in chart. See conscious sedation packet for vital signs and medication administration. Sedation ended at 1640. Hip was not in place. 2nd conscious sedation started at 1726, see packet for vital signs and medication administration. Sedation ended at 1800. Hip still not in place. Pt monitored with capnography, cardiac monitor, pulse ox, oxygen, ambu bag, and narcan at bedside. VSS. Pt admitted to surgery. Will continue to monitor and assess while offering support and reassurance.

## 2017-11-17 LAB
ANION GAP SERPL CALC-SCNC: 10 MMOL/L — SIGNIFICANT CHANGE UP (ref 5–17)
ANION GAP SERPL CALC-SCNC: 11 MMOL/L — SIGNIFICANT CHANGE UP (ref 5–17)
APTT BLD: 26.1 SEC — LOW (ref 27.5–37.4)
APTT BLD: 27.2 SEC — LOW (ref 27.5–37.4)
BLD GP AB SCN SERPL QL: NEGATIVE — SIGNIFICANT CHANGE UP
BUN SERPL-MCNC: 10 MG/DL — SIGNIFICANT CHANGE UP (ref 7–23)
BUN SERPL-MCNC: 11 MG/DL — SIGNIFICANT CHANGE UP (ref 7–23)
CALCIUM SERPL-MCNC: 8.4 MG/DL — SIGNIFICANT CHANGE UP (ref 8.4–10.5)
CALCIUM SERPL-MCNC: 9 MG/DL — SIGNIFICANT CHANGE UP (ref 8.4–10.5)
CHLORIDE SERPL-SCNC: 104 MMOL/L — SIGNIFICANT CHANGE UP (ref 96–108)
CHLORIDE SERPL-SCNC: 104 MMOL/L — SIGNIFICANT CHANGE UP (ref 96–108)
CO2 SERPL-SCNC: 23 MMOL/L — SIGNIFICANT CHANGE UP (ref 22–31)
CO2 SERPL-SCNC: 25 MMOL/L — SIGNIFICANT CHANGE UP (ref 22–31)
CREAT SERPL-MCNC: 0.56 MG/DL — SIGNIFICANT CHANGE UP (ref 0.5–1.3)
CREAT SERPL-MCNC: 0.58 MG/DL — SIGNIFICANT CHANGE UP (ref 0.5–1.3)
GLUCOSE SERPL-MCNC: 109 MG/DL — HIGH (ref 70–99)
GLUCOSE SERPL-MCNC: 120 MG/DL — HIGH (ref 70–99)
HCT VFR BLD CALC: 30.2 % — LOW (ref 34.5–45)
HCT VFR BLD CALC: 31.3 % — LOW (ref 34.5–45)
HGB BLD-MCNC: 10.4 G/DL — LOW (ref 11.5–15.5)
HGB BLD-MCNC: 10.7 G/DL — LOW (ref 11.5–15.5)
INR BLD: 1.04 RATIO — SIGNIFICANT CHANGE UP (ref 0.88–1.16)
INR BLD: 1.05 RATIO — SIGNIFICANT CHANGE UP (ref 0.88–1.16)
MCHC RBC-ENTMCNC: 30.8 PG — SIGNIFICANT CHANGE UP (ref 27–34)
MCHC RBC-ENTMCNC: 30.8 PG — SIGNIFICANT CHANGE UP (ref 27–34)
MCHC RBC-ENTMCNC: 34.3 GM/DL — SIGNIFICANT CHANGE UP (ref 32–36)
MCHC RBC-ENTMCNC: 34.5 GM/DL — SIGNIFICANT CHANGE UP (ref 32–36)
MCV RBC AUTO: 89.4 FL — SIGNIFICANT CHANGE UP (ref 80–100)
MCV RBC AUTO: 89.7 FL — SIGNIFICANT CHANGE UP (ref 80–100)
PLATELET # BLD AUTO: 289 K/UL — SIGNIFICANT CHANGE UP (ref 150–400)
PLATELET # BLD AUTO: 292 K/UL — SIGNIFICANT CHANGE UP (ref 150–400)
POTASSIUM SERPL-MCNC: 4.4 MMOL/L — SIGNIFICANT CHANGE UP (ref 3.5–5.3)
POTASSIUM SERPL-MCNC: 4.4 MMOL/L — SIGNIFICANT CHANGE UP (ref 3.5–5.3)
POTASSIUM SERPL-SCNC: 4.4 MMOL/L — SIGNIFICANT CHANGE UP (ref 3.5–5.3)
POTASSIUM SERPL-SCNC: 4.4 MMOL/L — SIGNIFICANT CHANGE UP (ref 3.5–5.3)
PROTHROM AB SERPL-ACNC: 11.3 SEC — SIGNIFICANT CHANGE UP (ref 9.8–12.7)
PROTHROM AB SERPL-ACNC: 11.4 SEC — SIGNIFICANT CHANGE UP (ref 9.8–12.7)
RBC # BLD: 3.38 M/UL — LOW (ref 3.8–5.2)
RBC # BLD: 3.48 M/UL — LOW (ref 3.8–5.2)
RBC # FLD: 13.1 % — SIGNIFICANT CHANGE UP (ref 10.3–14.5)
RBC # FLD: 13.4 % — SIGNIFICANT CHANGE UP (ref 10.3–14.5)
RH IG SCN BLD-IMP: NEGATIVE — SIGNIFICANT CHANGE UP
SODIUM SERPL-SCNC: 137 MMOL/L — SIGNIFICANT CHANGE UP (ref 135–145)
SODIUM SERPL-SCNC: 140 MMOL/L — SIGNIFICANT CHANGE UP (ref 135–145)
WBC # BLD: 6 K/UL — SIGNIFICANT CHANGE UP (ref 3.8–10.5)
WBC # BLD: 6.8 K/UL — SIGNIFICANT CHANGE UP (ref 3.8–10.5)
WBC # FLD AUTO: 6 K/UL — SIGNIFICANT CHANGE UP (ref 3.8–10.5)
WBC # FLD AUTO: 6.8 K/UL — SIGNIFICANT CHANGE UP (ref 3.8–10.5)

## 2017-11-17 PROCEDURE — 93010 ELECTROCARDIOGRAM REPORT: CPT

## 2017-11-17 PROCEDURE — 73501 X-RAY EXAM HIP UNI 1 VIEW: CPT | Mod: 26,RT

## 2017-11-17 RX ORDER — FLUOXETINE HCL 10 MG
10 CAPSULE ORAL AT BEDTIME
Qty: 0 | Refills: 0 | Status: DISCONTINUED | OUTPATIENT
Start: 2017-11-17 | End: 2017-11-19

## 2017-11-17 RX ORDER — TRIHEXYPHENIDYL HCL 2 MG
2 TABLET ORAL
Qty: 0 | Refills: 0 | Status: DISCONTINUED | OUTPATIENT
Start: 2017-11-17 | End: 2017-11-19

## 2017-11-17 RX ORDER — SODIUM CHLORIDE 9 MG/ML
1000 INJECTION INTRAMUSCULAR; INTRAVENOUS; SUBCUTANEOUS
Qty: 0 | Refills: 0 | Status: DISCONTINUED | OUTPATIENT
Start: 2017-11-17 | End: 2017-11-19

## 2017-11-17 RX ORDER — ONDANSETRON 8 MG/1
4 TABLET, FILM COATED ORAL ONCE
Qty: 0 | Refills: 0 | Status: DISCONTINUED | OUTPATIENT
Start: 2017-11-17 | End: 2017-11-17

## 2017-11-17 RX ORDER — FLUOXETINE HCL 10 MG
20 CAPSULE ORAL
Qty: 0 | Refills: 0 | Status: DISCONTINUED | OUTPATIENT
Start: 2017-11-17 | End: 2017-11-17

## 2017-11-17 RX ORDER — ACETAMINOPHEN 500 MG
650 TABLET ORAL EVERY 6 HOURS
Qty: 0 | Refills: 0 | Status: DISCONTINUED | OUTPATIENT
Start: 2017-11-17 | End: 2017-11-19

## 2017-11-17 RX ORDER — DOCUSATE SODIUM 100 MG
100 CAPSULE ORAL THREE TIMES A DAY
Qty: 0 | Refills: 0 | Status: DISCONTINUED | OUTPATIENT
Start: 2017-11-17 | End: 2017-11-19

## 2017-11-17 RX ORDER — THIOTHIXENE 5 MG
5 CAPSULE ORAL
Qty: 0 | Refills: 0 | Status: DISCONTINUED | OUTPATIENT
Start: 2017-11-17 | End: 2017-11-17

## 2017-11-17 RX ORDER — HYDROMORPHONE HYDROCHLORIDE 2 MG/ML
0.25 INJECTION INTRAMUSCULAR; INTRAVENOUS; SUBCUTANEOUS
Qty: 0 | Refills: 0 | Status: DISCONTINUED | OUTPATIENT
Start: 2017-11-17 | End: 2017-11-17

## 2017-11-17 RX ORDER — SENNA PLUS 8.6 MG/1
2 TABLET ORAL AT BEDTIME
Qty: 0 | Refills: 0 | Status: DISCONTINUED | OUTPATIENT
Start: 2017-11-17 | End: 2017-11-19

## 2017-11-17 RX ORDER — FLUOXETINE HCL 10 MG
10 CAPSULE ORAL AT BEDTIME
Qty: 0 | Refills: 0 | Status: DISCONTINUED | OUTPATIENT
Start: 2017-11-17 | End: 2017-11-17

## 2017-11-17 RX ORDER — PREGABALIN 225 MG/1
1000 CAPSULE ORAL DAILY
Qty: 0 | Refills: 0 | Status: DISCONTINUED | OUTPATIENT
Start: 2017-11-17 | End: 2017-11-19

## 2017-11-17 RX ORDER — PREGABALIN 225 MG/1
1000 CAPSULE ORAL DAILY
Qty: 0 | Refills: 0 | Status: DISCONTINUED | OUTPATIENT
Start: 2017-11-17 | End: 2017-11-17

## 2017-11-17 RX ORDER — OXYCODONE HYDROCHLORIDE 5 MG/1
5 TABLET ORAL
Qty: 0 | Refills: 0 | Status: DISCONTINUED | OUTPATIENT
Start: 2017-11-17 | End: 2017-11-19

## 2017-11-17 RX ORDER — THIOTHIXENE 5 MG
5 CAPSULE ORAL
Qty: 0 | Refills: 0 | Status: DISCONTINUED | OUTPATIENT
Start: 2017-11-17 | End: 2017-11-19

## 2017-11-17 RX ORDER — OXYCODONE HYDROCHLORIDE 5 MG/1
10 TABLET ORAL
Qty: 0 | Refills: 0 | Status: DISCONTINUED | OUTPATIENT
Start: 2017-11-17 | End: 2017-11-19

## 2017-11-17 RX ORDER — FLUOXETINE HCL 10 MG
20 CAPSULE ORAL
Qty: 0 | Refills: 0 | Status: DISCONTINUED | OUTPATIENT
Start: 2017-11-17 | End: 2017-11-19

## 2017-11-17 RX ORDER — FAMOTIDINE 10 MG/ML
20 INJECTION INTRAVENOUS
Qty: 0 | Refills: 0 | Status: DISCONTINUED | OUTPATIENT
Start: 2017-11-17 | End: 2017-11-19

## 2017-11-17 RX ORDER — ACETAMINOPHEN 500 MG
1000 TABLET ORAL ONCE
Qty: 0 | Refills: 0 | Status: DISCONTINUED | OUTPATIENT
Start: 2017-11-17 | End: 2017-11-17

## 2017-11-17 RX ADMIN — Medication 650 MILLIGRAM(S): at 21:45

## 2017-11-17 RX ADMIN — Medication 10 MILLIGRAM(S): at 21:10

## 2017-11-17 RX ADMIN — FAMOTIDINE 20 MILLIGRAM(S): 10 INJECTION INTRAVENOUS at 17:35

## 2017-11-17 RX ADMIN — Medication 5 MILLIGRAM(S): at 17:35

## 2017-11-17 RX ADMIN — Medication 5 MILLIGRAM(S): at 21:10

## 2017-11-17 RX ADMIN — SODIUM CHLORIDE 75 MILLILITER(S): 9 INJECTION, SOLUTION INTRAVENOUS at 05:25

## 2017-11-17 RX ADMIN — Medication 100 MILLIGRAM(S): at 21:10

## 2017-11-17 RX ADMIN — Medication 5 MILLIGRAM(S): at 14:52

## 2017-11-17 RX ADMIN — Medication 650 MILLIGRAM(S): at 21:11

## 2017-11-17 RX ADMIN — Medication 2 MILLIGRAM(S): at 17:35

## 2017-11-17 RX ADMIN — Medication 20 MILLIGRAM(S): at 14:06

## 2017-11-17 RX ADMIN — SODIUM CHLORIDE 75 MILLILITER(S): 9 INJECTION INTRAMUSCULAR; INTRAVENOUS; SUBCUTANEOUS at 21:11

## 2017-11-17 RX ADMIN — Medication 100 MILLIGRAM(S): at 14:06

## 2017-11-17 RX ADMIN — Medication 2 MILLIGRAM(S): at 09:28

## 2017-11-17 RX ADMIN — SODIUM CHLORIDE 75 MILLILITER(S): 9 INJECTION INTRAMUSCULAR; INTRAVENOUS; SUBCUTANEOUS at 13:28

## 2017-11-17 RX ADMIN — GABAPENTIN 300 MILLIGRAM(S): 400 CAPSULE ORAL at 05:25

## 2017-11-17 NOTE — H&P ADULT - FAMILY HISTORY
Grandparent  Still living? Unknown  Type 2 diabetes mellitus, Age at diagnosis: Age Unknown  Family history of hypertension, Age at diagnosis: Age Unknown  Family history of lung cancer, Age at diagnosis: Age Unknown

## 2017-11-17 NOTE — PROGRESS NOTE ADULT - SUBJECTIVE AND OBJECTIVE BOX
Patient is a 62y old  Female who presents with a chief complaint of right hip pain (17 Nov 2017 04:24)      HPI:    MEDICATIONS  (STANDING):  cyanocobalamin 1000 MICROGram(s) Oral daily  docusate sodium 100 milliGRAM(s) Oral three times a day  famotidine    Tablet 20 milliGRAM(s) Oral two times a day  FLUoxetine 20 milliGRAM(s) Oral <User Schedule>  FLUoxetine 10 milliGRAM(s) Oral at bedtime  sodium chloride 0.9%. 1000 milliLiter(s) (75 mL/Hr) IV Continuous <Continuous>  thiothixene 5 milliGRAM(s) Oral <User Schedule>  trihexyphenidyl 2 milliGRAM(s) Oral two times a day    MEDICATIONS  (PRN):  acetaminophen   Tablet 650 milliGRAM(s) Oral every 6 hours PRN For Temp greater than 38 C (100.4 F)  acetaminophen   Tablet. 650 milliGRAM(s) Oral every 6 hours PRN Mild Pain (1 - 3) or HA  oxyCODONE    IR 5 milliGRAM(s) Oral every 3 hours PRN Moderate Pain (4 - 6)  oxyCODONE    IR 10 milliGRAM(s) Oral every 3 hours PRN Severe Pain (7 - 10)  senna 2 Tablet(s) Oral at bedtime PRN Constipation      Allergies    chocolate (Rash)  No Known Drug Allergies    Intolerances        REVIEW OF SYSTEM:  CONSTITUTIONAL: No fever, No change in weight, No fatigue  HEAD: No headache, No dizziness, No recent trauma  EYES: No eye pain, No visual disturbances, No discharge  ENT:  No difficulty hearing, No tinnitus, No vertigo, No sinus pain, No throat pain  NECK: No pain, No stiffness  BREASTS: No pain, No masses, No nipple discharge  RESPIRATORY: No cough, No wheezing, No chills, No hemoptysis, No shortness of breath at rest or exertional shortness of breath  CARDIOVASCULAR: No chest pain, No palpitations, No dizziness, No CHF, No arrhythmia, No cardiomegaly, No leg swelling  GASTROINTESTINAL: No abdominal, No epigastric pain. No nausea, No vomiting, No hematemesis, No diarrhea, No constipation. No melena, No hematochezia. No GERD  GENITOURINARY: No dysuria, No frequency, No hematuria, No incontinence, No nocturia, No hesitancy,  SKIN: No itching, No burning, No rashes, No lesions   LYMPH NODES: No history of enlarged glands  ENDOCRINE: No heat or cold intolerance, No hair loss. No osteoporosis, No thyroid disease  MUSCULOSKELETAL: No joint pain or swelling, No muscle, back, or extremity pain  PSYCHIATRIC: No depression, No anxiety, No mood swings, No difficulty sleeping  HEME/LYMPH: No easy bruising, No anticoagulants, No bleeding disorder, No bleeding gums  ALLERGY AND IMMUNOLOGIC: No hives, No eczema  NEUROLOGICAL: No memory loss, No loss of strength, No numbness, No tremors        VITALS:   T(C): 36.8 (11-17-17 @ 17:12), Max: 37.1 (11-17-17 @ 13:00)  HR: 85 (11-17-17 @ 17:12) (77 - 103)  BP: 98/65 (11-17-17 @ 17:12) (98/65 - 161/89)  RR: 18 (11-17-17 @ 17:12) (14 - 18)  SpO2: 96% (11-17-17 @ 17:12) (95% - 100%)  Wt(kg): --    11-16 @ 07:01  -  11-17 @ 07:00  --------------------------------------------------------  IN: 600 mL / OUT: 850 mL / NET: -250 mL        PHYSICAL EXAM:  GENERAL: NAD, well nourished and conversant  HEAD:  Atraumatic  EYES: EOM, PERRLA, conjunctiva pink and sclera white  ENT: No tonsillar erythema, exudates, or enlargement, moist mucous membranes, good dentition, no lesions  NECK: Supple, No JVD, normal thyroid, carotids with normal upstrokes and no bruits  CHEST/LUNG: Clear to auscultation bilaterally, No rales, rhonchi, wheezing, or rubs  HEART: Regular rate and rhythm, No murmurs, rubs, or gallops  ABDOMEN: Soft, nondistended, no masses, guarding, tenderness or rebound, bowel sounds present  EXTREMITIES:  2+ Peripheral Pulses, No clubbing, cyanosis, or edema. No arthritis of shoulders, elbows, hands, hips, knees, ankles, or feet. No DJD C spine, T spine, or L/S spine  LYMPH: No lymphadenopathy noted  SKIN: No rashes or lesions  NERVOUS SYSTEM:  Alert & Oriented X3, normal cognitive function. Motor Strength 5/5 right upper and right lower.  5/5 left upper and left lower extremities, DTRs 2+ intact and symmetric    LABS:                          10.7   6.0   )-----------( 292      ( 17 Nov 2017 09:56 )             31.3     11-17    140  |  104  |  10  ----------------------------<  109<H>  4.4   |  25  |  0.56  11-17    137  |  104  |  11  ----------------------------<  120<H>  4.4   |  23  |  0.58  11-16    139  |  101  |  12  ----------------------------<  100<H>  4.3   |  21<L>  |  0.65    Ca    9.0      17 Nov 2017 09:56  Ca    8.4      17 Nov 2017 02:18  Ca    9.4      16 Nov 2017 14:12    TPro  6.4  /  Alb  4.3  /  TBili  0.3  /  DBili  x   /  AST  18  /  ALT  12  /  AlkPhos  91  11-16    CAPILLARY BLOOD GLUCOSE          RADIOLOGY & ADDITIONAL TESTS:      Consultant(s):    Care Discussed with Consultants/Other Providers [ ] YES  [ ] NO Patient is a 62y old  Female who presents with a chief complaint of right hip pain (17 Nov 2017 04:24)      HPI:   Ms Shetty presents with Right AFSHIN dislocation scheduled for operative intervention AM.  Medically stable to proceed to OR as planned.    MEDICATIONS  (STANDING):  cyanocobalamin 1000 MICROGram(s) Oral daily  docusate sodium 100 milliGRAM(s) Oral three times a day  famotidine    Tablet 20 milliGRAM(s) Oral two times a day  FLUoxetine 20 milliGRAM(s) Oral <User Schedule>  FLUoxetine 10 milliGRAM(s) Oral at bedtime  sodium chloride 0.9%. 1000 milliLiter(s) (75 mL/Hr) IV Continuous <Continuous>  thiothixene 5 milliGRAM(s) Oral <User Schedule>  trihexyphenidyl 2 milliGRAM(s) Oral two times a day    MEDICATIONS  (PRN):  acetaminophen   Tablet 650 milliGRAM(s) Oral every 6 hours PRN For Temp greater than 38 C (100.4 F)  acetaminophen   Tablet. 650 milliGRAM(s) Oral every 6 hours PRN Mild Pain (1 - 3) or HA  oxyCODONE    IR 5 milliGRAM(s) Oral every 3 hours PRN Moderate Pain (4 - 6)  oxyCODONE    IR 10 milliGRAM(s) Oral every 3 hours PRN Severe Pain (7 - 10)  senna 2 Tablet(s) Oral at bedtime PRN Constipation      Allergies    chocolate (Rash)  No Known Drug Allergies    Intolerances        REVIEW OF SYSTEM:  CONSTITUTIONAL: No fever, No change in weight, No fatigue  HEAD: No headache, No dizziness, No recent trauma  EYES: No eye pain, No visual disturbances, No discharge  ENT:  No difficulty hearing, No tinnitus, No vertigo, No sinus pain, No throat pain  NECK: No pain, No stiffness  BREASTS: No pain, No masses, No nipple discharge  RESPIRATORY: No cough, No wheezing, No chills, No hemoptysis, No shortness of breath at rest or exertional shortness of breath  CARDIOVASCULAR: No chest pain, No palpitations, No dizziness, No CHF, No arrhythmia, No cardiomegaly, No leg swelling  GASTROINTESTINAL: No abdominal, No epigastric pain. No nausea, No vomiting, No hematemesis, No diarrhea, No constipation. No melena, No hematochezia. No GERD  GENITOURINARY: No dysuria, No frequency, No hematuria, No incontinence, No nocturia, No hesitancy,  SKIN: No itching, No burning, No rashes, No lesions   LYMPH NODES: No history of enlarged glands  ENDOCRINE: No heat or cold intolerance, No hair loss. No osteoporosis, No thyroid disease  MUSCULOSKELETAL: No joint pain or swelling, No muscle, back, or extremity pain  PSYCHIATRIC: No depression, No anxiety, No mood swings, No difficulty sleeping  HEME/LYMPH: No easy bruising, No anticoagulants, No bleeding disorder, No bleeding gums  ALLERGY AND IMMUNOLOGIC: No hives, No eczema  NEUROLOGICAL: No memory loss, No loss of strength, No numbness, No tremors        VITALS:   T(C): 36.8 (11-17-17 @ 17:12), Max: 37.1 (11-17-17 @ 13:00)  HR: 85 (11-17-17 @ 17:12) (77 - 103)  BP: 98/65 (11-17-17 @ 17:12) (98/65 - 161/89)  RR: 18 (11-17-17 @ 17:12) (14 - 18)  SpO2: 96% (11-17-17 @ 17:12) (95% - 100%)  Wt(kg): --    11-16 @ 07:01  -  11-17 @ 07:00  --------------------------------------------------------  IN: 600 mL / OUT: 850 mL / NET: -250 mL        PHYSICAL EXAM:  GENERAL: NAD, well nourished and conversant  HEAD:  Atraumatic  EYES: EOM, PERRLA, conjunctiva pink and sclera white  ENT: No tonsillar erythema, exudates, or enlargement, moist mucous membranes, good dentition, no lesions  NECK: Supple, No JVD, normal thyroid, carotids with normal upstrokes and no bruits  CHEST/LUNG: Clear to auscultation bilaterally, No rales, rhonchi, wheezing, or rubs  HEART: Regular rate and rhythm, No murmurs, rubs, or gallops  ABDOMEN: Soft, nondistended, no masses, guarding, tenderness or rebound, bowel sounds present  EXTREMITIES:  2+ Peripheral Pulses, No clubbing, cyanosis, or edema. No arthritis of shoulders, elbows, hands, hips, knees, ankles, or feet. No DJD C spine, T spine, or L/S spine  LYMPH: No lymphadenopathy noted  SKIN: No rashes or lesions  NERVOUS SYSTEM:  Alert & Oriented X3, normal cognitive function. Motor Strength 5/5 right upper and right lower.  5/5 left upper and left lower extremities, DTRs 2+ intact and symmetric    LABS:                          10.7   6.0   )-----------( 292      ( 17 Nov 2017 09:56 )             31.3     11-17    140  |  104  |  10  ----------------------------<  109<H>  4.4   |  25  |  0.56  11-17    137  |  104  |  11  ----------------------------<  120<H>  4.4   |  23  |  0.58  11-16    139  |  101  |  12  ----------------------------<  100<H>  4.3   |  21<L>  |  0.65    Ca    9.0      17 Nov 2017 09:56  Ca    8.4      17 Nov 2017 02:18  Ca    9.4      16 Nov 2017 14:12    TPro  6.4  /  Alb  4.3  /  TBili  0.3  /  DBili  x   /  AST  18  /  ALT  12  /  AlkPhos  91  11-16    CAPILLARY BLOOD GLUCOSE          RADIOLOGY & ADDITIONAL TESTS:      Consultant(s):    Care Discussed with Consultants/Other Providers [ ] YES  [ ] NO

## 2017-11-17 NOTE — H&P ADULT - NSHPPHYSICALEXAM_GEN_ALL_CORE
Physical exam  VS: Afebrile, vital signs stable  Gen: NAD  right LE: Skin intact. No erythema/ecchymosis/warmth. No TTP bony prominences at knee/ankle/foot.+EHL/FHL/TA/GS. SILT L3-S1, +DP pulse, capillary refill brisk. Compartments soft and nontender.  Secondary survey: No TTP bony prominences with full painless AROM at baseline per patient. SILT. Capillary refill brisk. Able to SLR with contralateral leg. No TTP axial spine.

## 2017-11-17 NOTE — CHART NOTE - NSCHARTNOTEFT_GEN_A_CORE
Patient resting without complaints.  Denies chest pain, SOB, N/V.    T(C): 36.8 (11-17-17 @ 13:50)  T(F): 98.2 (11-17-17 @ 13:50)  HR: 86 (11-17-17 @ 13:50)  BP: 108/70 (11-17-17 @ 13:50)  RR: 18 (11-17-17 @ 13:50)  SpO2: 96% (11-17-17 @ 13:50)      Exam:  Alert and Oriented, No Acute Distress    R lower Extremity:  Calf Soft, Non-tender  +PF/DF  Sensation grossly intact  +DP Pulse                          10.7   6.0   )-----------( 292      ( 17 Nov 2017 09:56 )             31.3        140  |  104  |  10  ----------------------------<  109<H>  4.4   |  25  |  0.56      Post-op Pelvis XR done.    A/P: 62y Female w/ R AFSHIN dislocation s/p closed reduction under anesthesia; Stable  -Pain Control  -DVT PPx w SCD's; IS  -PT Eval-WBAT  -Continue Current Tx.    Phylicia Boone PA-C  Orthopedic Surgery  Pagers [phone]

## 2017-11-17 NOTE — BRIEF OPERATIVE NOTE - PROCEDURE
<<-----Click on this checkbox to enter Procedure Closed reduction of dislocation of hip with anesthesia  11/17/2017    Active  YASMINE

## 2017-11-17 NOTE — H&P ADULT - HISTORY OF PRESENT ILLNESS
HPI    62F complains of right hip pain for 1 day status post bending forward. Patient reports that she has been noncompliant with the postoperative restrictions as instructed by Dr Beavers. Patient denies numbness, paresthesias, headstrike, loss of consciousness, or any other signs/symptoms at this time. Patient is a community ambulator at baseline.    Allergies: NKDA  PMH: GERD, Diverticulitis, Uterine CA  PSH: R AFSHIN (Dr Beavers)  Social: Denies tobacco use  FH: Noncontributory  Imaging: XR right hip - dislocated AFSHIN

## 2017-11-17 NOTE — PROGRESS NOTE ADULT - SUBJECTIVE AND OBJECTIVE BOX
Pt S/E at bedside, no acute events overnight, pain controlled    AVSS  Gen: NAD, AAOx3    RLE:  Leg shortened  +EHL/FHL/TA/GS  SILT L3-S1  +DP/PT Pulses  Compartments soft  No calf TTP B/L    Vital Signs Last 24 Hrs  T(C): 36.6 (17 Nov 2017 04:31), Max: 36.8 (17 Nov 2017 00:39)  T(F): 97.9 (17 Nov 2017 04:31), Max: 98.2 (17 Nov 2017 00:39)  HR: 85 (17 Nov 2017 04:31) (79 - 116)  BP: 100/64 (17 Nov 2017 04:31) (100/64 - 143/77)  BP(mean): --  RR: 18 (17 Nov 2017 04:31) (16 - 18)  SpO2: 99% (17 Nov 2017 04:31) (98% - 100%)    A/P: 63 yo female with Right Hip Dislocation  1. Pain control  2. NPO and hold Anticoagulation for Closed vs Open Reduction in OR today  3. NWB R LE

## 2017-11-17 NOTE — H&P ADULT - ASSESSMENT
62F with right AFSHIN dislocation    OR 11/17/17 - closed vs open reduction, possible revision of components  NWB affected extremity  Pain control  NPO except meds  Hold chemical anticoagulation  IV fluids while NPO  Will discuss with attending and advise if change

## 2017-11-17 NOTE — H&P ADULT - NSHPATTENDINGPLANDISCUSS_GEN_ALL_CORE
the patient and patient's . Patient is indicated for closed reduction right AFSHIN dislocation, possible open reduction. Patient and patient's  understand and accept the risks of surgery.

## 2017-11-18 ENCOUNTER — TRANSCRIPTION ENCOUNTER (OUTPATIENT)
Age: 62
End: 2017-11-18

## 2017-11-18 LAB
HCT VFR BLD CALC: 27 % — LOW (ref 34.5–45)
HGB BLD-MCNC: 9.3 G/DL — LOW (ref 11.5–15.5)
MCHC RBC-ENTMCNC: 30.9 PG — SIGNIFICANT CHANGE UP (ref 27–34)
MCHC RBC-ENTMCNC: 34.5 GM/DL — SIGNIFICANT CHANGE UP (ref 32–36)
MCV RBC AUTO: 89.8 FL — SIGNIFICANT CHANGE UP (ref 80–100)
PLATELET # BLD AUTO: 267 K/UL — SIGNIFICANT CHANGE UP (ref 150–400)
RBC # BLD: 3.01 M/UL — LOW (ref 3.8–5.2)
RBC # FLD: 13.2 % — SIGNIFICANT CHANGE UP (ref 10.3–14.5)
WBC # BLD: 6.3 K/UL — SIGNIFICANT CHANGE UP (ref 3.8–10.5)
WBC # FLD AUTO: 6.3 K/UL — SIGNIFICANT CHANGE UP (ref 3.8–10.5)

## 2017-11-18 RX ORDER — SODIUM CHLORIDE 9 MG/ML
1000 INJECTION INTRAMUSCULAR; INTRAVENOUS; SUBCUTANEOUS ONCE
Qty: 0 | Refills: 0 | Status: COMPLETED | OUTPATIENT
Start: 2017-11-18 | End: 2017-11-18

## 2017-11-18 RX ORDER — HEPARIN SODIUM 5000 [USP'U]/ML
5000 INJECTION INTRAVENOUS; SUBCUTANEOUS EVERY 8 HOURS
Qty: 0 | Refills: 0 | Status: DISCONTINUED | OUTPATIENT
Start: 2017-11-18 | End: 2017-11-19

## 2017-11-18 RX ADMIN — Medication 20 MILLIGRAM(S): at 13:35

## 2017-11-18 RX ADMIN — SODIUM CHLORIDE 100 MILLILITER(S): 9 INJECTION INTRAMUSCULAR; INTRAVENOUS; SUBCUTANEOUS at 06:06

## 2017-11-18 RX ADMIN — Medication 5 MILLIGRAM(S): at 21:12

## 2017-11-18 RX ADMIN — Medication 5 MILLIGRAM(S): at 06:06

## 2017-11-18 RX ADMIN — Medication 10 MILLIGRAM(S): at 21:12

## 2017-11-18 RX ADMIN — Medication 100 MILLIGRAM(S): at 13:35

## 2017-11-18 RX ADMIN — Medication 5 MILLIGRAM(S): at 18:10

## 2017-11-18 RX ADMIN — Medication 2 MILLIGRAM(S): at 18:10

## 2017-11-18 RX ADMIN — FAMOTIDINE 20 MILLIGRAM(S): 10 INJECTION INTRAVENOUS at 06:06

## 2017-11-18 RX ADMIN — Medication 5 MILLIGRAM(S): at 00:16

## 2017-11-18 RX ADMIN — SODIUM CHLORIDE 2000 MILLILITER(S): 9 INJECTION INTRAMUSCULAR; INTRAVENOUS; SUBCUTANEOUS at 00:28

## 2017-11-18 RX ADMIN — HEPARIN SODIUM 5000 UNIT(S): 5000 INJECTION INTRAVENOUS; SUBCUTANEOUS at 23:50

## 2017-11-18 RX ADMIN — Medication 20 MILLIGRAM(S): at 06:06

## 2017-11-18 RX ADMIN — Medication 100 MILLIGRAM(S): at 06:06

## 2017-11-18 RX ADMIN — Medication 5 MILLIGRAM(S): at 12:13

## 2017-11-18 RX ADMIN — FAMOTIDINE 20 MILLIGRAM(S): 10 INJECTION INTRAVENOUS at 18:10

## 2017-11-18 RX ADMIN — OXYCODONE HYDROCHLORIDE 5 MILLIGRAM(S): 5 TABLET ORAL at 16:33

## 2017-11-18 RX ADMIN — Medication 5 MILLIGRAM(S): at 10:19

## 2017-11-18 RX ADMIN — Medication 2 MILLIGRAM(S): at 06:06

## 2017-11-18 RX ADMIN — PREGABALIN 1000 MICROGRAM(S): 225 CAPSULE ORAL at 12:13

## 2017-11-18 RX ADMIN — Medication 5 MILLIGRAM(S): at 15:28

## 2017-11-18 RX ADMIN — Medication 5 MILLIGRAM(S): at 23:50

## 2017-11-18 RX ADMIN — OXYCODONE HYDROCHLORIDE 5 MILLIGRAM(S): 5 TABLET ORAL at 17:04

## 2017-11-18 NOTE — PROGRESS NOTE ADULT - SUBJECTIVE AND OBJECTIVE BOX
Patient is a 62y old  Female who presents with a chief complaint of inability to ambulate (16 Oct 2017 18:39)  Pain 2/10 right hip AFSHIN; for avascular necrosis.  Incentive spirometry DVT an GI prophylaxis  Pain control is adequate.  The patient is now pain free when attempting to walk.        MEDICATIONS  (STANDING):  cyanocobalamin 1000 MICROGram(s) Oral daily  docusate sodium 100 milliGRAM(s) Oral three times a day  famotidine    Tablet 20 milliGRAM(s) Oral two times a day  FLUoxetine 20 milliGRAM(s) Oral <User Schedule>  FLUoxetine 10 milliGRAM(s) Oral at bedtime  sodium chloride 0.9%. 1000 milliLiter(s) (100 mL/Hr) IV Continuous <Continuous>  thiothixene 5 milliGRAM(s) Oral <User Schedule>  trihexyphenidyl 2 milliGRAM(s) Oral two times a day    MEDICATIONS  (PRN):  acetaminophen   Tablet 650 milliGRAM(s) Oral every 6 hours PRN For Temp greater than 38 C (100.4 F)  acetaminophen   Tablet. 650 milliGRAM(s) Oral every 6 hours PRN Mild Pain (1 - 3) or HA  oxyCODONE    IR 5 milliGRAM(s) Oral every 3 hours PRN Moderate Pain (4 - 6)  oxyCODONE    IR 10 milliGRAM(s) Oral every 3 hours PRN Severe Pain (7 - 10)  senna 2 Tablet(s) Oral at bedtime PRN Constipation      REVIEW OF SYSTEM:  CONSTITUTIONAL: No fever, No change in weight, No fatigue  HEAD: No headache, No dizziness, No recent trauma  EYES: No eye pain, No visual disturbances, No discharge  ENT:  No difficulty hearing, No tinnitus, No vertigo, No sinus pain, No throat pain  NECK: No pain, No stiffness  BREASTS: No pain, No masses, No nipple discharge  RESPIRATORY: No cough, No wheezing, No chills, No hemoptysis, No shortness of breath at rest or exertional shortness of breath  CARDIOVASCULAR: No chest pain, No palpitations, No dizziness, No CHF, No arrhythmia, No cardiomegaly, No leg swelling  GASTROINTESTINAL: No abdominal, No epigastric pain. No nausea, No vomiting, No hematemesis, No diarrhea, No constipation. No melena, No hematochezia. No GERD  GENITOURINARY: No dysuria, No frequency, No hematuria, No incontinence, No nocturia, No hesitancy,  SKIN: No itching, No burning, No rashes, No lesions   LYMPH NODES: No history of enlarged glands  ENDOCRINE: No heat or cold intolerance, No hair loss. No osteoporosis, No thyroid disease  MUSCULOSKELETAL: Right hip AFSHIN  PSYCHIATRIC: No depression, No anxiety, No mood swings, No difficulty sleeping  HEME/LYMPH: No easy bruising, No anticoagulants, No bleeding disorder, No bleeding gums  ALLERGY AND IMMUNOLOGIC: No hives, No eczema  NEUROLOGICAL: No memory loss, No loss of strength, No numbness, No tremors    VITALS:   T(C): 36.7 (11-18-17 @ 21:13), Max: 36.7 (11-18-17 @ 21:13)  HR: 87 (11-18-17 @ 21:13) (80 - 96)  BP: 100/64 (11-18-17 @ 21:13) (82/50 - 122/67)  RR: 18 (11-18-17 @ 21:13) (16 - 18)  SpO2: 94% (11-18-17 @ 21:13) (94% - 99%)  Wt(kg): --    PHYSICAL EXAM:  GENERAL: NAD, well nourished and conversant  HEAD:  Atraumatic  EYES: EOM, PERRLA, conjunctiva pink and sclera white  ENT: No tonsillar erythema, exudates, or enlargement, moist mucous membranes, good dentition, no lesions  NECK: Supple, No JVD, normal thyroid, carotids with normal upstrokes and no bruits  CHEST/LUNG: Clear to auscultation bilaterally, No rales, rhonchi, wheezing, or rubs  HEART: Regular rate and rhythm, No murmurs, rubs, or gallops  ABDOMEN: Soft, nondistended, no masses, guarding, tenderness or rebound, bowel sounds present  EXTREMITIES:  2+ Peripheral Pulses, No clubbing, cyanosis, or edema.   Right hip AFSHIN pain 2-3/10  LYMPH: No lymphadenopathy noted  SKIN: No rashes or lesions  NERVOUS SYSTEM:  Alert & Oriented X3, normal cognitive function. Motor Strength 5/5 right upper and right lower.  5/5 left upper and left lower extremities, DTRs 2+ intact and symmetric      LABS:        CBC Full  -  ( 18 Nov 2017 06:06 )  WBC Count : 6.3 K/uL  Hemoglobin : 9.3 g/dL  Hematocrit : 27.0 %  Platelet Count - Automated : 267 K/uL  Mean Cell Volume : 89.8 fl  Mean Cell Hemoglobin : 30.9 pg  Mean Cell Hemoglobin Concentration : 34.5 gm/dL  Auto Neutrophil # : x  Auto Lymphocyte # : x  Auto Monocyte # : x  Auto Eosinophil # : x  Auto Basophil # : x  Auto Neutrophil % : x  Auto Lymphocyte % : x  Auto Monocyte % : x  Auto Eosinophil % : x  Auto Basophil % : x    11-17    140  |  104  |  10  ----------------------------<  109<H>  4.4   |  25  |  0.56    Ca    9.0      17 Nov 2017 09:56        PT/INR - ( 17 Nov 2017 09:56 )   PT: 11.3 sec;   INR: 1.05 ratio         PTT - ( 17 Nov 2017 09:56 )  PTT:26.1 sec    CAPILLARY BLOOD GLUCOSE          RADIOLOGY & ADDITIONAL TESTS:

## 2017-11-18 NOTE — PHYSICAL THERAPY INITIAL EVALUATION ADULT - DISCHARGE DISPOSITION, PT EVAL
Home with Home PT to assess safety, increase strength and endurance assisting with functional activities and ADLs.

## 2017-11-18 NOTE — DISCHARGE NOTE ADULT - MEDICATION SUMMARY - MEDICATIONS TO TAKE
I will START or STAY ON the medications listed below when I get home from the hospital:    acetaminophen 325 mg oral tablet  -- 2 tab(s) by mouth every 6 hours, As Needed for mild pain or fever greater than 100.4F  -- Indication: For for pain/fever    gabapentin 300 mg oral capsule  -- 1 cap(s) by mouth 3 times a day  -- Indication: For for pain    FLUoxetine 20 mg oral tablet  --  by mouth 2 times a day  -- Indication: For for depression    trihexyphenidyl 2 mg oral tablet  --  by mouth 2 times a day  -- Indication: For for parkinson    thiothixene 5 mg oral capsule  -- 1 cap(s) by mouth  1 Tab  AM  1 @ 11 AM  4 Tab bedtime  -- Indication: For for psyc med    Pepcid 20 mg oral tablet  -- 1 tab(s) by mouth 2 times a day  -- Indication: For GI prophylaxis    docusate sodium 100 mg oral capsule  -- 1 cap(s) by mouth 3 times a day  -- Indication: For Stool softener    senna oral tablet  -- 2 tab(s) by mouth once a day (at bedtime), As needed, Constipation  -- Indication: For laxative    Vitamin C  -- Indication: For Supplement    Vitamin D3  -- Indication: For Supplement

## 2017-11-18 NOTE — DISCHARGE NOTE ADULT - PATIENT PORTAL LINK FT
“You can access the Follow[facility] Patient Portal, offered by [facility], by registering with the following website: [email or web address]”

## 2017-11-18 NOTE — DISCHARGE NOTE ADULT - PLAN OF CARE
improved ADL's, pain control Please follow up with Dr. Harper 7-10 days after your discharge from the hospital.  PT-wbat with posterior and anterior precautions.

## 2017-11-18 NOTE — DISCHARGE NOTE ADULT - CARE PLAN
Principal Discharge DX:	Status post total replacement of right hip  Goal:	improved ADL's, pain control  Instructions for follow-up, activity and diet:	Please follow up with Dr. Harper 7-10 days after your discharge from the hospital.  PT-wbat with posterior and anterior precautions.

## 2017-11-18 NOTE — PROGRESS NOTE ADULT - SUBJECTIVE AND OBJECTIVE BOX
Post-Anesthetic Evaluation:    The Patient was interviewed and evaluated    Vital Signs Last 24 Hrs  T(C): 36.6 (18 Nov 2017 04:40), Max: 37.1 (17 Nov 2017 13:00)  T(F): 97.9 (18 Nov 2017 04:40), Max: 98.8 (17 Nov 2017 13:00)  HR: 83 (18 Nov 2017 04:40) (77 - 103)  BP: 97/59 (18 Nov 2017 04:40) (82/50 - 161/89)  BP(mean): 80 (17 Nov 2017 13:15) (80 - 113)  RR: 16 (18 Nov 2017 04:40) (14 - 18)  SpO2: 98% (18 Nov 2017 04:40) (95% - 100%)    Evaluation:      (X) No apparent complications or complaints regarding anesthesia care at this time  (X) All questions were answered    Condition:  (X) Stable      ( ) Guarded      ( ) Critical    Recommendations:  (X) None     ( ) Other:

## 2017-11-18 NOTE — DISCHARGE NOTE ADULT - HOSPITAL COURSE
HPI    62F complains of right hip pain for 1 day status post bending forward. Patient reports that she has been noncompliant with the postoperative restrictions as instructed by Dr Beavers. Patient denies numbness, paresthesias, headstrike, loss of consciousness, or any other signs/symptoms at this time. Patient is a community ambulator at baseline.    Allergies: NKDA  PMH: GERD, Diverticulitis, Uterine CA  PSH: R AFSHIN (Dr Beavers)  Social: Denies tobacco use  FH: Noncontributory  Imaging: XR right hip - dislocated AFSHIN     Review of Systems:  Other Review of Systems: All other review of systems negative, except as noted in HPI	      Allergies and Intolerances:        Allergies:  	No Known Drug Allergies:   	chocolate: Food, Rash    Home Medications:   * Patient Currently Takes Medications as of 23-Oct-2017 11:45 documented in Structured Notes  · 	Lovenox 40 mg/0.4 mL injectable solution: 40 milligram(s) injectable once a day x 21 days  · 	celecoxib 200 mg oral capsule: 1 cap(s) orally once a day (before a meal) MDD:1  · 	traMADol 50 mg oral tablet: 1 tab(s) orally every 8 hours MDD:3  · 	oxyCODONE 5 mg oral tablet: 1-2 tab(s) orally every 4 to 6 hours, As Needed MDD:8  · 	senna oral tablet: 2 tab(s) orally once a day (at bedtime), As needed, Constipation  · 	docusate sodium 100 mg oral capsule: 1 cap(s) orally 3 times a day  · 	acetaminophen 325 mg oral tablet: 2 tab(s) orally every 6 hours  · 	acetaminophen 325 mg oral tablet: 2 tab(s) orally every 6 hours, As needed, For Temp greater than 38 C (100.4 F)  · 	thiothixene 5 mg oral capsule: 1 cap(s) orally  1 Tab  AM  1 @ 11 AM  4 Tab bedtime  · 	trihexyphenidyl 2 mg oral tablet:  orally 2 times a day  · 	FLUoxetine 20 mg oral tablet:  orally 2 times a day  · 	Pepcid 20 mg oral tablet: 1 tab(s) orally 2 times a day  · 	gabapentin 300 mg oral capsule: 1 cap(s) orally 3 times a day  · 	Vitamin C:   · 	Vitamin D3:     .    Patient History:    Past Medical History:  Depression with anxiety  Since 1999 controlled on Meds & monthley Visit to Psychiatrist  Diverticulitis    GERD (gastroesophageal reflux disease)    Uterine cancer  11/2014  Radiation  completed  4/2015.     Past Surgical History:  S/P hernia repair  1/2015  S/P YEIMY-BSO (total abdominal hysterectomy and bilateral salpingo-oophorectomy)  11/2014  S/P tonsillectomy    Status post bunionectomy  Bilateral 1982.    11/16/17-Patient admitted to [facility].    [id number] uncomplicated closed reduction of Rt total hip arthroplasty  11/18/17-Evaluated and treated by PT, with recommendations HPI    62F complains of right hip pain for 1 day status post bending forward. Patient reports that she has been noncompliant with the postoperative restrictions as instructed by Dr Beavers. Patient denies numbness, paresthesias, headstrike, loss of consciousness, or any other signs/symptoms at this time. Patient is a community ambulator at baseline.    Allergies: NKDA  PMH: GERD, Diverticulitis, Uterine CA  PSH: R AFSHIN (Dr Beavers)  Social: Denies tobacco use  FH: Noncontributory  Imaging: XR right hip - dislocated AFSHIN     Review of Systems:  Other Review of Systems: All other review of systems negative, except as noted in HPI	      Allergies and Intolerances:        Allergies:  	No Known Drug Allergies:   	chocolate: Food, Rash    Home Medications:   * Patient Currently Takes Medications as of 23-Oct-2017 11:45 documented in Structured Notes  · 	Lovenox 40 mg/0.4 mL injectable solution: 40 milligram(s) injectable once a day x 21 days  · 	celecoxib 200 mg oral capsule: 1 cap(s) orally once a day (before a meal) MDD:1  · 	traMADol 50 mg oral tablet: 1 tab(s) orally every 8 hours MDD:3  · 	oxyCODONE 5 mg oral tablet: 1-2 tab(s) orally every 4 to 6 hours, As Needed MDD:8  · 	senna oral tablet: 2 tab(s) orally once a day (at bedtime), As needed, Constipation  · 	docusate sodium 100 mg oral capsule: 1 cap(s) orally 3 times a day  · 	acetaminophen 325 mg oral tablet: 2 tab(s) orally every 6 hours  · 	acetaminophen 325 mg oral tablet: 2 tab(s) orally every 6 hours, As needed, For Temp greater than 38 C (100.4 F)  · 	thiothixene 5 mg oral capsule: 1 cap(s) orally  1 Tab  AM  1 @ 11 AM  4 Tab bedtime  · 	trihexyphenidyl 2 mg oral tablet:  orally 2 times a day  · 	FLUoxetine 20 mg oral tablet:  orally 2 times a day  · 	Pepcid 20 mg oral tablet: 1 tab(s) orally 2 times a day  · 	gabapentin 300 mg oral capsule: 1 cap(s) orally 3 times a day  · 	Vitamin C:   · 	Vitamin D3:     .    Patient History:    Past Medical History:  Depression with anxiety  Since 1999 controlled on Meds & monthley Visit to Psychiatrist  Diverticulitis    GERD (gastroesophageal reflux disease)    Uterine cancer  11/2014  Radiation  completed  4/2015.     Past Surgical History:  S/P hernia repair  1/2015  S/P YEIMY-BSO (total abdominal hysterectomy and bilateral salpingo-oophorectomy)  11/2014  S/P tonsillectomy    Status post bunionectomy  Bilateral 1982.    11/16/17-Patient admitted to [facility].    11/[id number] uncomplicated closed reduction of Rt total hip arthroplasty  11/18/17-Evaluated and treated by PT, recommended for home with home PT  11/19/17-patient medically stable for safe discharge home HPI    62F complains of right hip pain for 1 day status post bending forward. Patient reports that she has been noncompliant with the postoperative restrictions as instructed by Dr Beavers. Patient denies numbness, paresthesias, headstrike, loss of consciousness, or any other signs/symptoms at this time. Patient is a community ambulator at baseline.    Allergies: NKDA  PMH: GERD, Diverticulitis, Uterine CA  PSH: R AFSHIN (Dr Beavers)  Social: Denies tobacco use  FH: Noncontributory  Imaging: XR right hip - dislocated AFSHIN     Review of Systems:  Other Review of Systems: All other review of systems negative, except as noted in HPI	      Allergies and Intolerances:        Allergies:  	No Known Drug Allergies:   	chocolate: Food, Rash    Home Medications:   * Patient Currently Takes Medications as of 23-Oct-2017 11:45 documented in Structured Notes  · 	Lovenox 40 mg/0.4 mL injectable solution: 40 milligram(s) injectable once a day x 21 days  · 	celecoxib 200 mg oral capsule: 1 cap(s) orally once a day (before a meal) MDD:1  · 	traMADol 50 mg oral tablet: 1 tab(s) orally every 8 hours MDD:3  · 	oxyCODONE 5 mg oral tablet: 1-2 tab(s) orally every 4 to 6 hours, As Needed MDD:8  · 	senna oral tablet: 2 tab(s) orally once a day (at bedtime), As needed, Constipation  · 	docusate sodium 100 mg oral capsule: 1 cap(s) orally 3 times a day  · 	acetaminophen 325 mg oral tablet: 2 tab(s) orally every 6 hours  · 	acetaminophen 325 mg oral tablet: 2 tab(s) orally every 6 hours, As needed, For Temp greater than 38 C (100.4 F)  · 	thiothixene 5 mg oral capsule: 1 cap(s) orally  1 Tab  AM  1 @ 11 AM  4 Tab bedtime  · 	trihexyphenidyl 2 mg oral tablet:  orally 2 times a day  · 	FLUoxetine 20 mg oral tablet:  orally 2 times a day  · 	Pepcid 20 mg oral tablet: 1 tab(s) orally 2 times a day  · 	gabapentin 300 mg oral capsule: 1 cap(s) orally 3 times a day  · 	Vitamin C:   · 	Vitamin D3:     .    Patient History:    Past Medical History:  Depression with anxiety  Since 1999 controlled on Meds & monthley Visit to Psychiatrist  Diverticulitis    GERD (gastroesophageal reflux disease)    Uterine cancer  11/2014  Radiation  completed  4/2015.     Past Surgical History:  S/P hernia repair  1/2015  S/P YEIMY-BSO (total abdominal hysterectomy and bilateral salpingo-oophorectomy)  11/2014  S/P tonsillectomy    Status post bunionectomy  Bilateral 1982.    11/16/17-Patient admitted to [facility].    11/-96-Kvnoqglmx uncomplicated closed reduction of Rt total hip arthroplasty  11/18/17-Evaluated and treated by PT, recommended for home with home PT  11/19/17-Cleared by PT for safe discharge home  11/19/17-patient medically stable for safe discharge home

## 2017-11-18 NOTE — PHYSICAL THERAPY INITIAL EVALUATION ADULT - PERTINENT HX OF CURRENT PROBLEM, REHAB EVAL
62F complains of right hip pain for 1 day status post bending forward. Patient reports that she has been noncompliant with the postoperative restrictions as instructed by Dr Beavers. Pt now s/p closed reduction of right total hip replacement dislocation 11/17. CT: Complete anterosuperior dislocation of the patient's total right hip

## 2017-11-18 NOTE — DISCHARGE NOTE ADULT - MEDICATION SUMMARY - MEDICATIONS TO STOP TAKING
I will STOP taking the medications listed below when I get home from the hospital:    oxyCODONE 5 mg oral tablet  -- 1-2 tab(s) by mouth every 4 to 6 hours, As Needed MDD:8

## 2017-11-18 NOTE — PHYSICAL THERAPY INITIAL EVALUATION ADULT - ADDITIONAL COMMENTS
Prior to this admission pt was amb via rolling walker (I). She lives with spouse in a apartment with 7 steps to enter +HR. Pt owns rolling walker and SAC.

## 2017-11-18 NOTE — PROGRESS NOTE ADULT - SUBJECTIVE AND OBJECTIVE BOX
Patient is a 62y old  Female who presents with a chief complaint of right hip pain (17 Nov 2017 04:24)  Patient s/p closed reduction of right total hip replacement dislocation POD#1  Patient resting without complaints.  No chest pain, SOB, N/V.    T(C): 36.6 (11-18-17 @ 04:40), Max: 37.1 (11-17-17 @ 13:00)  HR: 83 (11-18-17 @ 04:40) (77 - 103)  BP: 97/59 (11-18-17 @ 04:40) (82/50 - 161/89)  RR: 16 (11-18-17 @ 04:40) (14 - 18)  SpO2: 98% (11-18-17 @ 04:40) (95% - 100%)    Exam:  Alert and Oriented, No Acute Distress  Cards: +S1/S2, RRR  Pulm: CTAB  Lower Extremities: Right L/E moving digits, +DF/PF  Calves Soft, Non-tender bilaterally  +PF/DF/EHL/FHL  SILT  +DP Pulse                       9.3    6.3   )-----------( 267      ( 18 Nov 2017 06:06 )             27.0    11-17  140  |  104  |  10  ----------------------------<  109<H>  4.4   |  25  |  0.56  Ca    9.0      17 Nov 2017 09:56  TPro  6.4  /  Alb  4.3  /  TBili  0.3  /  DBili  x   /  AST  18  /  ALT  12  /  AlkPhos  91  11-16

## 2017-11-18 NOTE — DISCHARGE NOTE ADULT - NS AS ACTIVITY OBS
Walking-Indoors allowed/No Heavy lifting/straining/Do not drive or operate machinery/stairs/shower with assistance/Do not make important decisions/Showering allowed/Stairs allowed/Walking-Outdoors allowed

## 2017-11-18 NOTE — PROGRESS NOTE ADULT - ATTENDING COMMENTS
Patient is stable in chair. Neuro intact. Patient reports that she was noncompliant with hip precautions, discontinued her cane and bent forward to pick something up off the floor.     I have re-explained the critical need for hip precautions and compliance. Risk of repeat dislocation is now increased due to lo9ss of soft tissue repair. If she redislocates, she will require conversion to a constrained liner.

## 2017-11-18 NOTE — DISCHARGE NOTE ADULT - CARE PROVIDER_API CALL
Eduardo Harper (MD), Orthopaedic Surgery  [address] [place] [id number]  Phone: [phone]  Fax: [phone]

## 2017-11-19 VITALS
RESPIRATION RATE: 18 BRPM | HEART RATE: 94 BPM | SYSTOLIC BLOOD PRESSURE: 128 MMHG | TEMPERATURE: 98 F | OXYGEN SATURATION: 96 % | DIASTOLIC BLOOD PRESSURE: 80 MMHG

## 2017-11-19 RX ORDER — TRAMADOL HYDROCHLORIDE 50 MG/1
1 TABLET ORAL
Qty: 21 | Refills: 0 | OUTPATIENT
Start: 2017-11-19

## 2017-11-19 RX ADMIN — Medication 5 MILLIGRAM(S): at 12:07

## 2017-11-19 RX ADMIN — Medication 20 MILLIGRAM(S): at 07:03

## 2017-11-19 RX ADMIN — Medication 2 MILLIGRAM(S): at 07:03

## 2017-11-19 RX ADMIN — Medication 650 MILLIGRAM(S): at 10:19

## 2017-11-19 RX ADMIN — Medication 5 MILLIGRAM(S): at 09:43

## 2017-11-19 RX ADMIN — Medication 650 MILLIGRAM(S): at 09:49

## 2017-11-19 RX ADMIN — Medication 20 MILLIGRAM(S): at 13:36

## 2017-11-19 RX ADMIN — HEPARIN SODIUM 5000 UNIT(S): 5000 INJECTION INTRAVENOUS; SUBCUTANEOUS at 13:36

## 2017-11-19 RX ADMIN — Medication 5 MILLIGRAM(S): at 07:03

## 2017-11-19 RX ADMIN — FAMOTIDINE 20 MILLIGRAM(S): 10 INJECTION INTRAVENOUS at 07:03

## 2017-11-19 RX ADMIN — HEPARIN SODIUM 5000 UNIT(S): 5000 INJECTION INTRAVENOUS; SUBCUTANEOUS at 07:03

## 2017-11-19 RX ADMIN — PREGABALIN 1000 MICROGRAM(S): 225 CAPSULE ORAL at 12:07

## 2017-11-19 NOTE — PROGRESS NOTE ADULT - PROBLEM SELECTOR PLAN 2
[place] diet and use Omeprazole 40 mg po qd
[place] diet and use Omeprazole 40 mg po qd
[place] diet and use Omeprazole 40 mg po qd

## 2017-11-19 NOTE — PROGRESS NOTE ADULT - PROBLEM SELECTOR PLAN 4
Pain currently at 4/10 continue pain management to decrease pain level to 1-2

## 2017-11-19 NOTE — OCCUPATIONAL THERAPY INITIAL EVALUATION ADULT - DIAGNOSIS, OT EVAL
Pt impaired for ADLS/IALDS/FUNCTIONAL MOBILITY 2' decreased strength, coordination, endurance and sittng/standing tolerance.

## 2017-11-19 NOTE — PROGRESS NOTE ADULT - PROBLEM SELECTOR PROBLEM 1
Dislocation of right hip, initial encounter

## 2017-11-19 NOTE — PROGRESS NOTE ADULT - ASSESSMENT
63 y/o fm s/p closed reduction of right total hip replacement dislocation POD#1, physical therapy for WBAT, incentive spiromter  Marie Connors PA-C  Orthopaedic Surgery  Team pager [phone]  [facility] [phone]  [phone]
A/p: 62yFemale s/p Closed reduction in OR Rt hip arthroplasty  VSS. NAD.
61 yo woman s/p right THR, present with dislocation of thr right hip. schedued for revision of the hip
63 yo woman s/p right THR, present with dislocation of thr right hip. Patient is s/p relocation of hip under anesthesia
63 yo woman s/p right THR, present with dislocation of thr right hip. Patient is s/p relocation of hip under anesthesia

## 2017-11-19 NOTE — PROGRESS NOTE ADULT - PROBLEM SELECTOR PLAN 1
PT/OT-WBAT with anterior/posterior hip precautions  IS  DVT PPx  Pain Control  Continue Current Tx.  Home today if PT cleared      Tony Acuña PA-C  Team Pager: [phone]
Patient is medically stable to proceed with surgery as planned.
Patient tolerated procedure well  Pain meds as needed  PO as tolerated  DVT and GI prophylaixs
Patient tolerated procedure well  Pain meds as needed  PO as tolerated  DVT and GI prophylaixs

## 2017-11-19 NOTE — PROGRESS NOTE ADULT - SUBJECTIVE AND OBJECTIVE BOX
Post op Day [2 ]    Patient resting without complaints.  No chest pain, SOB, N/V.    T(C): 36.8 (11-19-17 @ 09:46), Max: 36.8 (11-19-17 @ 09:46)  HR: 88 (11-19-17 @ 09:46) (80 - 96)  BP: 113/71 (11-19-17 @ 09:46) (100/64 - 122/67)  RR: 18 (11-19-17 @ 09:46) (18 - 18)  SpO2: 98% (11-19-17 @ 09:46) (94% - 99%)  Wt(kg): --    Exam:  Alert and Oriented, No Acute Distress  RLE: Compartments soft/compressible  Calves Soft, Non-tender bilaterally  +PF/DF/EHL/FHL  SILT  +DP Pulse                        9.3    6.3   )-----------( 267      ( 18 Nov 2017 06:06 )             27.0    11-17    140  |  104  |  10  ----------------------------<  109<H>  4.4   |  25  |  0.56    Ca    9.0      17 Nov 2017 09:56

## 2017-11-19 NOTE — OCCUPATIONAL THERAPY INITIAL EVALUATION ADULT - ADDITIONAL COMMENTS
pt resides with  in basement apt with 7 steps to enter + hand rail. pt owns hip kit from last hospitalization. pt was fully I with adls/ialds and functional mobility taks. pt was able to perform community ambulation without device prior to hospitalization

## 2017-11-19 NOTE — ED ADULT TRIAGE NOTE - BP NONINVASIVE DIASTOLIC (MM HG)
C/O 4th finger pain of right hand - swelling to that finger started 2 weeks ago. No known injury. Pt states he has chronic arthritis.    68

## 2017-11-19 NOTE — PROGRESS NOTE ADULT - PROVIDER SPECIALTY LIST ADULT
Anesthesia
Internal Medicine
Orthopedics
Internal Medicine
Internal Medicine

## 2017-11-19 NOTE — PROGRESS NOTE ADULT - ATTENDING COMMENTS
Patient discharged home  continue current meds  PO as tolerated  Follow up with ortho  Py aware of plan

## 2017-11-19 NOTE — OCCUPATIONAL THERAPY INITIAL EVALUATION ADULT - PLANNED THERAPY INTERVENTIONS, OT EVAL
bed mobility training/neuromuscular re-education/transfer training/strengthening/stretching/ADL retraining/IADL retraining/balance training/motor coordination training/parent/caregiver training...

## 2017-11-21 ENCOUNTER — APPOINTMENT (OUTPATIENT)
Dept: NEUROLOGY | Facility: CLINIC | Age: 62
End: 2017-11-21

## 2017-12-19 PROCEDURE — 86901 BLOOD TYPING SEROLOGIC RH(D): CPT

## 2017-12-19 PROCEDURE — 81025 URINE PREGNANCY TEST: CPT

## 2017-12-19 PROCEDURE — C1776: CPT

## 2017-12-19 PROCEDURE — 86850 RBC ANTIBODY SCREEN: CPT

## 2017-12-19 PROCEDURE — 72170 X-RAY EXAM OF PELVIS: CPT

## 2017-12-19 PROCEDURE — 81001 URINALYSIS AUTO W/SCOPE: CPT

## 2017-12-19 PROCEDURE — 97161 PT EVAL LOW COMPLEX 20 MIN: CPT

## 2017-12-19 PROCEDURE — 93005 ELECTROCARDIOGRAM TRACING: CPT

## 2017-12-19 PROCEDURE — 71045 X-RAY EXAM CHEST 1 VIEW: CPT

## 2017-12-19 PROCEDURE — 80053 COMPREHEN METABOLIC PANEL: CPT

## 2017-12-19 PROCEDURE — 71260 CT THORAX DX C+: CPT

## 2017-12-19 PROCEDURE — 97110 THERAPEUTIC EXERCISES: CPT

## 2017-12-19 PROCEDURE — 85652 RBC SED RATE AUTOMATED: CPT

## 2017-12-19 PROCEDURE — 73502 X-RAY EXAM HIP UNI 2-3 VIEWS: CPT

## 2017-12-19 PROCEDURE — 86140 C-REACTIVE PROTEIN: CPT

## 2017-12-19 PROCEDURE — 84155 ASSAY OF PROTEIN SERUM: CPT

## 2017-12-19 PROCEDURE — 72192 CT PELVIS W/O DYE: CPT

## 2017-12-19 PROCEDURE — 99152 MOD SED SAME PHYS/QHP 5/>YRS: CPT

## 2017-12-19 PROCEDURE — 80048 BASIC METABOLIC PNL TOTAL CA: CPT

## 2017-12-19 PROCEDURE — 73552 X-RAY EXAM OF FEMUR 2/>: CPT

## 2017-12-19 PROCEDURE — 99285 EMERGENCY DEPT VISIT HI MDM: CPT | Mod: 25

## 2017-12-19 PROCEDURE — 72196 MRI PELVIS W/DYE: CPT

## 2017-12-19 PROCEDURE — 73501 X-RAY EXAM HIP UNI 1 VIEW: CPT

## 2017-12-19 PROCEDURE — 76377 3D RENDER W/INTRP POSTPROCES: CPT

## 2017-12-19 PROCEDURE — 97166 OT EVAL MOD COMPLEX 45 MIN: CPT

## 2017-12-19 PROCEDURE — 76000 FLUOROSCOPY <1 HR PHYS/QHP: CPT

## 2017-12-19 PROCEDURE — 86900 BLOOD TYPING SEROLOGIC ABO: CPT

## 2017-12-19 PROCEDURE — 97162 PT EVAL MOD COMPLEX 30 MIN: CPT

## 2017-12-19 PROCEDURE — 97116 GAIT TRAINING THERAPY: CPT

## 2017-12-19 PROCEDURE — 97165 OT EVAL LOW COMPLEX 30 MIN: CPT

## 2017-12-19 PROCEDURE — 85610 PROTHROMBIN TIME: CPT

## 2017-12-19 PROCEDURE — 87070 CULTURE OTHR SPECIMN AEROBIC: CPT

## 2017-12-19 PROCEDURE — 83615 LACTATE (LD) (LDH) ENZYME: CPT

## 2017-12-19 PROCEDURE — C1713: CPT

## 2017-12-19 PROCEDURE — 88305 TISSUE EXAM BY PATHOLOGIST: CPT

## 2017-12-19 PROCEDURE — A9585: CPT

## 2017-12-19 PROCEDURE — 85730 THROMBOPLASTIN TIME PARTIAL: CPT

## 2017-12-19 PROCEDURE — 81003 URINALYSIS AUTO W/O SCOPE: CPT

## 2017-12-19 PROCEDURE — 97530 THERAPEUTIC ACTIVITIES: CPT

## 2017-12-19 PROCEDURE — 84165 PROTEIN E-PHORESIS SERUM: CPT

## 2017-12-19 PROCEDURE — 96374 THER/PROPH/DIAG INJ IV PUSH: CPT | Mod: XU

## 2017-12-19 PROCEDURE — 85027 COMPLETE CBC AUTOMATED: CPT

## 2017-12-19 PROCEDURE — 97535 SELF CARE MNGMENT TRAINING: CPT

## 2017-12-19 PROCEDURE — 93971 EXTREMITY STUDY: CPT

## 2017-12-19 PROCEDURE — 96375 TX/PRO/DX INJ NEW DRUG ADDON: CPT | Mod: XU

## 2017-12-19 PROCEDURE — 99153 MOD SED SAME PHYS/QHP EA: CPT

## 2017-12-19 PROCEDURE — 88311 DECALCIFY TISSUE: CPT

## 2017-12-19 PROCEDURE — 87075 CULTR BACTERIA EXCEPT BLOOD: CPT

## 2017-12-19 PROCEDURE — 72195 MRI PELVIS W/O DYE: CPT

## 2017-12-19 PROCEDURE — 74177 CT ABD & PELVIS W/CONTRAST: CPT

## 2018-01-22 ENCOUNTER — MEDICATION RENEWAL (OUTPATIENT)
Age: 63
End: 2018-01-22

## 2018-01-24 RX ORDER — GABAPENTIN 300 MG/1
300 CAPSULE ORAL
Qty: 60 | Refills: 1 | Status: DISCONTINUED | COMMUNITY
Start: 2017-10-11 | End: 2018-01-24

## 2018-02-20 ENCOUNTER — APPOINTMENT (OUTPATIENT)
Dept: INTERNAL MEDICINE | Facility: CLINIC | Age: 63
End: 2018-02-20
Payer: MEDICARE

## 2018-02-20 VITALS — SYSTOLIC BLOOD PRESSURE: 120 MMHG | DIASTOLIC BLOOD PRESSURE: 80 MMHG | RESPIRATION RATE: 16 BRPM | HEART RATE: 72 BPM

## 2018-02-20 PROCEDURE — 99213 OFFICE O/P EST LOW 20 MIN: CPT | Mod: 25

## 2018-02-20 PROCEDURE — 36415 COLL VENOUS BLD VENIPUNCTURE: CPT

## 2018-02-21 LAB
ALBUMIN SERPL ELPH-MCNC: 4.7 G/DL
ALP BLD-CCNC: 86 U/L
ALT SERPL-CCNC: 12 U/L
ANION GAP SERPL CALC-SCNC: 9 MMOL/L
AST SERPL-CCNC: 14 U/L
BILIRUB SERPL-MCNC: 0.2 MG/DL
BUN SERPL-MCNC: 13 MG/DL
CALCIUM SERPL-MCNC: 10 MG/DL
CHLORIDE SERPL-SCNC: 101 MMOL/L
CO2 SERPL-SCNC: 26 MMOL/L
CREAT SERPL-MCNC: 0.81 MG/DL
GLUCOSE SERPL-MCNC: 101 MG/DL
POTASSIUM SERPL-SCNC: 4.2 MMOL/L
PROT SERPL-MCNC: 7.3 G/DL
SODIUM SERPL-SCNC: 136 MMOL/L

## 2018-02-22 ENCOUNTER — APPOINTMENT (OUTPATIENT)
Dept: GYNECOLOGIC ONCOLOGY | Facility: CLINIC | Age: 63
End: 2018-02-22
Payer: MEDICARE

## 2018-02-22 VITALS
HEIGHT: 67 IN | BODY MASS INDEX: 23.07 KG/M2 | SYSTOLIC BLOOD PRESSURE: 122 MMHG | DIASTOLIC BLOOD PRESSURE: 82 MMHG | WEIGHT: 147 LBS

## 2018-02-22 PROCEDURE — 57500 BIOPSY OF CERVIX: CPT

## 2018-02-22 PROCEDURE — 99213 OFFICE O/P EST LOW 20 MIN: CPT | Mod: 25

## 2018-02-27 LAB — HPV HIGH+LOW RISK DNA PNL CVX: NOT DETECTED

## 2018-03-09 LAB
CORE LAB BIOPSY: NORMAL
CYTOLOGY CVX/VAG DOC THIN PREP: NORMAL

## 2018-04-21 ENCOUNTER — RX RENEWAL (OUTPATIENT)
Age: 63
End: 2018-04-21

## 2018-05-08 ENCOUNTER — APPOINTMENT (OUTPATIENT)
Dept: INTERNAL MEDICINE | Facility: CLINIC | Age: 63
End: 2018-05-08

## 2018-06-05 ENCOUNTER — MEDICATION RENEWAL (OUTPATIENT)
Age: 63
End: 2018-06-05

## 2018-07-19 ENCOUNTER — APPOINTMENT (OUTPATIENT)
Dept: INTERNAL MEDICINE | Facility: CLINIC | Age: 63
End: 2018-07-19
Payer: MEDICARE

## 2018-07-19 VITALS
HEART RATE: 80 BPM | TEMPERATURE: 98.1 F | DIASTOLIC BLOOD PRESSURE: 80 MMHG | WEIGHT: 152 LBS | RESPIRATION RATE: 16 BRPM | SYSTOLIC BLOOD PRESSURE: 130 MMHG | BODY MASS INDEX: 23.86 KG/M2 | HEIGHT: 67 IN

## 2018-07-19 PROCEDURE — 99213 OFFICE O/P EST LOW 20 MIN: CPT

## 2018-07-19 RX ORDER — TRAMADOL HYDROCHLORIDE AND ACETAMINOPHEN 37.5; 325 MG/1; MG/1
37.5-325 TABLET, FILM COATED ORAL DAILY
Qty: 10 | Refills: 0 | Status: DISCONTINUED | COMMUNITY
Start: 2017-10-11 | End: 2018-07-19

## 2018-07-19 NOTE — ASSESSMENT
[FreeTextEntry1] : Pt with likely gastroenteritis  -vs food poisoning\par No evidence of SBO\par To try Pepto Bismol and liquids/bland diet\par If worse or not better to call.

## 2018-07-19 NOTE — PHYSICAL EXAM
[No Acute Distress] : no acute distress [Well Nourished] : well nourished [Well Developed] : well developed [Well-Appearing] : well-appearing [Normal Sclera/Conjunctiva] : normal sclera/conjunctiva [Normal Oropharynx] : the oropharynx was normal [No JVD] : no jugular venous distention [Supple] : supple [No Lymphadenopathy] : no lymphadenopathy [No Respiratory Distress] : no respiratory distress  [Clear to Auscultation] : lungs were clear to auscultation bilaterally [No Accessory Muscle Use] : no accessory muscle use [Normal Rate] : normal rate  [Regular Rhythm] : with a regular rhythm [Normal S1, S2] : normal S1 and S2 [No Murmur] : no murmur heard [No Edema] : there was no peripheral edema [Soft] : abdomen soft [Non Tender] : non-tender [No HSM] : no HSM [Normal Bowel Sounds] : normal bowel sounds

## 2018-07-19 NOTE — HISTORY OF PRESENT ILLNESS
[FreeTextEntry1] : Started with vomiting yesterday  - ate at [facility] Monday  - not sick - \par Diarrhea started last night\par Some chills\par Drinking liquids\par Did not vomit this AM\par

## 2018-07-26 NOTE — DISCHARGE NOTE ADULT - ABILITY TO HEAR (WITH HEARING AID OR HEARING APPLIANCE IF NORMALLY USED):
Addended by: KIESHA HAM on: 7/26/2018 11:44 AM     Modules accepted: Orders     Adequate: hears normal conversation without difficulty

## 2018-07-27 ENCOUNTER — RX RENEWAL (OUTPATIENT)
Age: 63
End: 2018-07-27

## 2018-08-23 ENCOUNTER — APPOINTMENT (OUTPATIENT)
Dept: GYNECOLOGIC ONCOLOGY | Facility: CLINIC | Age: 63
End: 2018-08-23
Payer: MEDICARE

## 2018-08-23 VITALS
DIASTOLIC BLOOD PRESSURE: 76 MMHG | BODY MASS INDEX: 24.17 KG/M2 | HEIGHT: 67 IN | SYSTOLIC BLOOD PRESSURE: 112 MMHG | WEIGHT: 154 LBS

## 2018-08-23 PROCEDURE — 99213 OFFICE O/P EST LOW 20 MIN: CPT

## 2018-09-10 ENCOUNTER — RX RENEWAL (OUTPATIENT)
Age: 63
End: 2018-09-10

## 2018-09-27 ENCOUNTER — APPOINTMENT (OUTPATIENT)
Dept: RADIATION ONCOLOGY | Facility: CLINIC | Age: 63
End: 2018-09-27
Payer: MEDICARE

## 2018-09-27 VITALS
WEIGHT: 156.08 LBS | RESPIRATION RATE: 14 BRPM | OXYGEN SATURATION: 97 % | SYSTOLIC BLOOD PRESSURE: 124 MMHG | BODY MASS INDEX: 24.45 KG/M2 | DIASTOLIC BLOOD PRESSURE: 77 MMHG | HEART RATE: 90 BPM | TEMPERATURE: 97.88 F

## 2018-09-27 PROCEDURE — 99213 OFFICE O/P EST LOW 20 MIN: CPT

## 2018-09-27 NOTE — HISTORY OF PRESENT ILLNESS
[FreeTextEntry1] : Chasity Shetty is a 63-year-old woman with a history of stage IB FIGO grade 3 endometrial carcinoma status post YEIMY BSO in November 2014. Her postoperative course was complicated by an incarcerated incisional hernia she underwent an open repair in January 2015. because she had high-grade disease was considered high risk she underwent adjuvant radiation therapy to the pelviis from 3/10/2015 - 4/16/2015 to a total dose of 5040 cGy. \par \par She returns today for a routine follow up visit.  She is s/p a right hip replacement in Oct 2017. Denies any urinary or bowel complaints. Denies vaginal bleeding or discharge. Continues to follow up with Dr. Martinez every 6 months.

## 2018-09-27 NOTE — VITALS
[Maximal Pain Intensity: 0/10] : 0/10 [Least Pain Intensity: 0/10] : 0/10 [90: Able to carry normal activity; minor signs or symptoms of disease.] : 90: Able to carry normal activity; minor signs or symptoms of disease.

## 2018-09-27 NOTE — REASON FOR VISIT
[Routine Follow-Up] : routine follow-up visit for [Endometrial Cancer] : endometrial cancer [Other: _____] : [unfilled]

## 2018-09-27 NOTE — PHYSICAL EXAM
[Abdomen Soft] : soft [Nondistended] : nondistended [Abdomen Tenderness] : non-tender [Normal] : normal sphincter tone, no rectal mass and no blood on examination glove [Normal External Genitalia] : normal external genitalia  [Normal Vaginal Cuff] : vaginal cuff without lesion or nodularity [Cervical Lymph Nodes Enlarged Anterior Bilaterally] : anterior cervical [Supraclavicular Lymph Nodes Enlarged Bilaterally] : supraclavicular [Inguinal Lymph Nodes Enlarged Bilaterally] : inguinal

## 2018-09-27 NOTE — REVIEW OF SYSTEMS
[Constipation: Grade 0] : Constipation: Grade 0 [Diarrhea: Grade 0] : Diarrhea: Grade 0 [Fatigue: Grade 0] : Fatigue: Grade 0 [Urinary Incontinence: Grade 0] : Urinary Incontinence: Grade 0  [Urinary Retention: Grade 0] : Urinary Retention: Grade 0 [Urinary Tract Pain: Grade 0] : Urinary Tract Pain: Grade 0 [Urinary Urgency: Grade 0] : Urinary Urgency: Grade 0 [Urinary Frequency: Grade 0] : Urinary Frequency: Grade 0 [Genital Edema: Grade 0] : Genital Edema: Grade 0 [Vaginal Stricture: Grade 1 - Asymptomatic; mild vaginal shortening or narrowing] : Vaginal Stricture: Grade 1 - Asymptomatic; mild vaginal shortening or narrowing

## 2018-10-15 ENCOUNTER — APPOINTMENT (OUTPATIENT)
Dept: INTERNAL MEDICINE | Facility: CLINIC | Age: 63
End: 2018-10-15

## 2018-10-16 ENCOUNTER — APPOINTMENT (OUTPATIENT)
Dept: INTERNAL MEDICINE | Facility: CLINIC | Age: 63
End: 2018-10-16

## 2018-10-16 ENCOUNTER — APPOINTMENT (OUTPATIENT)
Dept: INTERNAL MEDICINE | Facility: CLINIC | Age: 63
End: 2018-10-16
Payer: MEDICARE

## 2018-10-16 VITALS — SYSTOLIC BLOOD PRESSURE: 120 MMHG | TEMPERATURE: 207.32 F | DIASTOLIC BLOOD PRESSURE: 70 MMHG

## 2018-10-16 PROCEDURE — G0008: CPT

## 2018-10-16 PROCEDURE — 99213 OFFICE O/P EST LOW 20 MIN: CPT | Mod: 25

## 2018-10-16 PROCEDURE — 90686 IIV4 VACC NO PRSV 0.5 ML IM: CPT

## 2018-12-03 ENCOUNTER — APPOINTMENT (OUTPATIENT)
Dept: INTERNAL MEDICINE | Facility: CLINIC | Age: 63
End: 2018-12-03

## 2018-12-11 ENCOUNTER — APPOINTMENT (OUTPATIENT)
Dept: INTERNAL MEDICINE | Facility: CLINIC | Age: 63
End: 2018-12-11
Payer: MEDICARE

## 2018-12-11 ENCOUNTER — RESULT REVIEW (OUTPATIENT)
Age: 63
End: 2018-12-11

## 2018-12-11 ENCOUNTER — CLINICAL ADVICE (OUTPATIENT)
Age: 63
End: 2018-12-11

## 2018-12-11 LAB
APPEARANCE: CLEAR
BASOPHILS # BLD AUTO: 0.01 K/UL
BASOPHILS NFR BLD AUTO: 0.2 %
BILIRUBIN URINE: NEGATIVE
BLOOD URINE: NEGATIVE
COLOR: YELLOW
EOSINOPHIL # BLD AUTO: 0.01 K/UL
EOSINOPHIL NFR BLD AUTO: 0.2 %
GLUCOSE QUALITATIVE U: NEGATIVE MG/DL
HBA1C MFR BLD HPLC: 5.2 %
HCT VFR BLD CALC: 42.2 %
HGB BLD-MCNC: 13.8 G/DL
IMM GRANULOCYTES NFR BLD AUTO: 0.2 %
KETONES URINE: NEGATIVE
LEUKOCYTE ESTERASE URINE: NEGATIVE
LYMPHOCYTES # BLD AUTO: 0.41 K/UL
LYMPHOCYTES NFR BLD AUTO: 6.8 %
MAN DIFF?: NORMAL
MCHC RBC-ENTMCNC: 29.2 PG
MCHC RBC-ENTMCNC: 32.7 GM/DL
MCV RBC AUTO: 89.2 FL
MONOCYTES # BLD AUTO: 0.69 K/UL
MONOCYTES NFR BLD AUTO: 11.4 %
NEUTROPHILS # BLD AUTO: 4.91 K/UL
NEUTROPHILS NFR BLD AUTO: 81.2 %
NITRITE URINE: NEGATIVE
PH URINE: 7.5
PLATELET # BLD AUTO: 366 K/UL
PROTEIN URINE: NEGATIVE MG/DL
RBC # BLD: 4.73 M/UL
RBC # FLD: 13.7 %
SPECIFIC GRAVITY URINE: 1.02
T4 SERPL-MCNC: 7 UG/DL
TSH SERPL-ACNC: 1.99 UIU/ML
UROBILINOGEN URINE: NEGATIVE MG/DL
WBC # FLD AUTO: 6.04 K/UL

## 2018-12-11 PROCEDURE — 36415 COLL VENOUS BLD VENIPUNCTURE: CPT

## 2018-12-18 ENCOUNTER — APPOINTMENT (OUTPATIENT)
Dept: INTERNAL MEDICINE | Facility: CLINIC | Age: 63
End: 2018-12-18
Payer: MEDICARE

## 2018-12-18 ENCOUNTER — NON-APPOINTMENT (OUTPATIENT)
Age: 63
End: 2018-12-18

## 2018-12-18 VITALS
BODY MASS INDEX: 23.86 KG/M2 | DIASTOLIC BLOOD PRESSURE: 80 MMHG | WEIGHT: 152 LBS | HEIGHT: 67 IN | SYSTOLIC BLOOD PRESSURE: 110 MMHG

## 2018-12-18 DIAGNOSIS — R10.31 RIGHT LOWER QUADRANT PAIN: ICD-10-CM

## 2018-12-18 DIAGNOSIS — Z87.19 PERSONAL HISTORY OF OTHER DISEASES OF THE DIGESTIVE SYSTEM: ICD-10-CM

## 2018-12-18 DIAGNOSIS — R42 DIZZINESS AND GIDDINESS: ICD-10-CM

## 2018-12-18 DIAGNOSIS — E55.9 VITAMIN D DEFICIENCY, UNSPECIFIED: ICD-10-CM

## 2018-12-18 DIAGNOSIS — Z87.898 PERSONAL HISTORY OF OTHER SPECIFIED CONDITIONS: ICD-10-CM

## 2018-12-18 DIAGNOSIS — M79.604 PAIN IN RIGHT LEG: ICD-10-CM

## 2018-12-18 DIAGNOSIS — Z00.00 ENCOUNTER FOR GENERAL ADULT MEDICAL EXAMINATION W/OUT ABNORMAL FINDINGS: ICD-10-CM

## 2018-12-18 DIAGNOSIS — M54.9 DORSALGIA, UNSPECIFIED: ICD-10-CM

## 2018-12-18 DIAGNOSIS — I83.90 ASYMPTOMATIC VARICOSE VEINS OF UNSPECIFIED LOWER EXTREMITY: ICD-10-CM

## 2018-12-18 DIAGNOSIS — H61.23 IMPACTED CERUMEN, BILATERAL: ICD-10-CM

## 2018-12-18 DIAGNOSIS — D23.72 OTHER BENIGN NEOPLASM OF SKIN OF LEFT LOWER LIMB, INCLUDING HIP: ICD-10-CM

## 2018-12-18 DIAGNOSIS — N13.30 UNSPECIFIED HYDRONEPHROSIS: ICD-10-CM

## 2018-12-18 LAB
25(OH)D3 SERPL-MCNC: 27.3 NG/ML
ALBUMIN SERPL ELPH-MCNC: 4.2 G/DL
ALP BLD-CCNC: 85 U/L
ALT SERPL-CCNC: 15 U/L
ANION GAP SERPL CALC-SCNC: 12 MMOL/L
AST SERPL-CCNC: 17 U/L
BILIRUB SERPL-MCNC: 0.4 MG/DL
BUN SERPL-MCNC: 13 MG/DL
CALCIUM SERPL-MCNC: 9.5 MG/DL
CHLORIDE SERPL-SCNC: 101 MMOL/L
CHOLEST SERPL-MCNC: 194 MG/DL
CHOLEST/HDLC SERPL: 2.9 RATIO
CO2 SERPL-SCNC: 27 MMOL/L
CREAT SERPL-MCNC: 0.82 MG/DL
GLUCOSE SERPL-MCNC: 108 MG/DL
HDLC SERPL-MCNC: 66 MG/DL
LDLC SERPL CALC-MCNC: 114 MG/DL
POTASSIUM SERPL-SCNC: 4.5 MMOL/L
PROT SERPL-MCNC: 7 G/DL
SODIUM SERPL-SCNC: 140 MMOL/L
TRIGL SERPL-MCNC: 71 MG/DL

## 2018-12-18 PROCEDURE — 93000 ELECTROCARDIOGRAM COMPLETE: CPT

## 2018-12-18 PROCEDURE — G0439: CPT | Mod: 25

## 2018-12-18 RX ORDER — CHROMIUM 200 MCG
1000 TABLET ORAL
Refills: 1 | Status: ACTIVE | COMMUNITY
Start: 2018-12-18

## 2018-12-18 RX ORDER — ERGOCALCIFEROL (VITAMIN D2) 50 MCG
50 MCG CAPSULE ORAL
Refills: 0 | Status: ACTIVE | COMMUNITY

## 2019-01-14 ENCOUNTER — APPOINTMENT (OUTPATIENT)
Dept: INTERNAL MEDICINE | Facility: CLINIC | Age: 64
End: 2019-01-14
Payer: MEDICARE

## 2019-01-14 VITALS
HEIGHT: 67 IN | HEART RATE: 80 BPM | BODY MASS INDEX: 24.17 KG/M2 | DIASTOLIC BLOOD PRESSURE: 80 MMHG | SYSTOLIC BLOOD PRESSURE: 120 MMHG | TEMPERATURE: 207.5 F | RESPIRATION RATE: 16 BRPM | WEIGHT: 154 LBS

## 2019-01-14 PROCEDURE — 99213 OFFICE O/P EST LOW 20 MIN: CPT

## 2019-01-14 RX ORDER — THIOTHIXENE 10 MG/1
10 CAPSULE ORAL
Qty: 90 | Refills: 0 | Status: DISCONTINUED | COMMUNITY
Start: 2018-07-13

## 2019-01-14 RX ORDER — FLUOXETINE HYDROCHLORIDE 10 MG/1
10 CAPSULE ORAL
Qty: 30 | Refills: 0 | Status: DISCONTINUED | COMMUNITY
Start: 2018-04-17

## 2019-01-14 NOTE — HISTORY OF PRESENT ILLNESS
[FreeTextEntry1] : URI - since Friday  -started with a sore throat.\par Slight runny nose\par No fever\par Non productive cough\par No earaches.

## 2019-01-14 NOTE — ASSESSMENT
[FreeTextEntry1] : Pt with viral URI\par Can use Robitussen, Afrin, and Tylenol\par FU if not better

## 2019-01-14 NOTE — PHYSICAL EXAM
[No Acute Distress] : no acute distress [Well Nourished] : well nourished [Well Developed] : well developed [Well-Appearing] : well-appearing [Normal Sclera/Conjunctiva] : normal sclera/conjunctiva [Normal Oropharynx] : the oropharynx was normal [Normal TMs] : both tympanic membranes were normal [No JVD] : no jugular venous distention [Supple] : supple [No Lymphadenopathy] : no lymphadenopathy [No Respiratory Distress] : no respiratory distress  [Clear to Auscultation] : lungs were clear to auscultation bilaterally [No Accessory Muscle Use] : no accessory muscle use [Normal Rate] : normal rate  [Regular Rhythm] : with a regular rhythm [Normal S1, S2] : normal S1 and S2 [No Murmur] : no murmur heard [No Edema] : there was no peripheral edema [Soft] : abdomen soft [Non Tender] : non-tender [No HSM] : no HSM

## 2019-01-22 ENCOUNTER — RX RENEWAL (OUTPATIENT)
Age: 64
End: 2019-01-22

## 2019-03-07 ENCOUNTER — APPOINTMENT (OUTPATIENT)
Dept: GYNECOLOGIC ONCOLOGY | Facility: CLINIC | Age: 64
End: 2019-03-07

## 2019-03-14 ENCOUNTER — APPOINTMENT (OUTPATIENT)
Dept: INTERNAL MEDICINE | Facility: CLINIC | Age: 64
End: 2019-03-14
Payer: MEDICARE

## 2019-03-14 VITALS
SYSTOLIC BLOOD PRESSURE: 120 MMHG | BODY MASS INDEX: 24.17 KG/M2 | HEIGHT: 67 IN | WEIGHT: 154 LBS | HEART RATE: 72 BPM | DIASTOLIC BLOOD PRESSURE: 80 MMHG | RESPIRATION RATE: 16 BRPM

## 2019-03-14 PROCEDURE — 69209 REMOVE IMPACTED EAR WAX UNI: CPT | Mod: 59

## 2019-03-14 PROCEDURE — 99213 OFFICE O/P EST LOW 20 MIN: CPT | Mod: 25

## 2019-03-14 NOTE — PHYSICAL EXAM
[No Acute Distress] : no acute distress [Well Nourished] : well nourished [Well Developed] : well developed [Well-Appearing] : well-appearing [Normal Sclera/Conjunctiva] : normal sclera/conjunctiva [Normal Oropharynx] : the oropharynx was normal [No JVD] : no jugular venous distention [Supple] : supple [No Lymphadenopathy] : no lymphadenopathy [No Respiratory Distress] : no respiratory distress  [Clear to Auscultation] : lungs were clear to auscultation bilaterally [No Accessory Muscle Use] : no accessory muscle use [Regular Rhythm] : with a regular rhythm [Normal S1, S2] : normal S1 and S2 [No Murmur] : no murmur heard [No Edema] : there was no peripheral edema [de-identified] : Both eras impacted with wax - Right removed  -left could not be removed

## 2019-03-14 NOTE — HISTORY OF PRESENT ILLNESS
[FreeTextEntry1] : URI since Sunday  - no fever\par Right ear clogged\par Slight sore throat\par Runny nose\par Coughing\par No n, v

## 2019-03-14 NOTE — ASSESSMENT
[FreeTextEntry1] : Pt with URI  -viral  -can use Robitussin\par Right ear wax removed\par Left ear - to use Debrox drops  -if not better by next week - to fu after Debrox and to try irrigation again.

## 2019-04-11 ENCOUNTER — APPOINTMENT (OUTPATIENT)
Dept: GYNECOLOGIC ONCOLOGY | Facility: CLINIC | Age: 64
End: 2019-04-11
Payer: MEDICARE

## 2019-04-11 VITALS
SYSTOLIC BLOOD PRESSURE: 144 MMHG | HEIGHT: 67 IN | BODY MASS INDEX: 24.17 KG/M2 | WEIGHT: 154 LBS | DIASTOLIC BLOOD PRESSURE: 85 MMHG

## 2019-04-11 DIAGNOSIS — C54.1 MALIGNANT NEOPLASM OF ENDOMETRIUM: ICD-10-CM

## 2019-04-11 PROCEDURE — 99213 OFFICE O/P EST LOW 20 MIN: CPT

## 2019-04-14 LAB — HPV HIGH+LOW RISK DNA PNL CVX: NOT DETECTED

## 2019-04-17 LAB — CYTOLOGY CVX/VAG DOC THIN PREP: NORMAL

## 2019-04-23 ENCOUNTER — APPOINTMENT (OUTPATIENT)
Dept: INTERNAL MEDICINE | Facility: CLINIC | Age: 64
End: 2019-04-23
Payer: MEDICARE

## 2019-04-23 VITALS
WEIGHT: 153 LBS | HEIGHT: 67 IN | SYSTOLIC BLOOD PRESSURE: 110 MMHG | BODY MASS INDEX: 24.01 KG/M2 | DIASTOLIC BLOOD PRESSURE: 70 MMHG

## 2019-04-23 DIAGNOSIS — R73.01 IMPAIRED FASTING GLUCOSE: ICD-10-CM

## 2019-04-23 DIAGNOSIS — E53.8 DEFICIENCY OF OTHER SPECIFIED B GROUP VITAMINS: ICD-10-CM

## 2019-04-23 DIAGNOSIS — H61.22 IMPACTED CERUMEN, LEFT EAR: ICD-10-CM

## 2019-04-23 DIAGNOSIS — J06.9 ACUTE UPPER RESPIRATORY INFECTION, UNSPECIFIED: ICD-10-CM

## 2019-04-23 DIAGNOSIS — M48.00 SPINAL STENOSIS, SITE UNSPECIFIED: ICD-10-CM

## 2019-04-23 DIAGNOSIS — H61.21 IMPACTED CERUMEN, RIGHT EAR: ICD-10-CM

## 2019-04-23 PROCEDURE — 36415 COLL VENOUS BLD VENIPUNCTURE: CPT

## 2019-04-23 PROCEDURE — 99214 OFFICE O/P EST MOD 30 MIN: CPT | Mod: 25

## 2019-04-24 ENCOUNTER — RESULT REVIEW (OUTPATIENT)
Age: 64
End: 2019-04-24

## 2019-04-24 LAB
ALBUMIN SERPL ELPH-MCNC: 4.7 G/DL
ALP BLD-CCNC: 78 U/L
ALT SERPL-CCNC: 15 U/L
ANION GAP SERPL CALC-SCNC: 10 MMOL/L
AST SERPL-CCNC: 19 U/L
BASOPHILS # BLD AUTO: 0.05 K/UL
BASOPHILS NFR BLD AUTO: 0.7 %
BILIRUB SERPL-MCNC: 0.3 MG/DL
BUN SERPL-MCNC: 13 MG/DL
CALCIUM SERPL-MCNC: 9.9 MG/DL
CHLORIDE SERPL-SCNC: 103 MMOL/L
CO2 SERPL-SCNC: 27 MMOL/L
CREAT SERPL-MCNC: 0.68 MG/DL
EOSINOPHIL # BLD AUTO: 0.01 K/UL
EOSINOPHIL NFR BLD AUTO: 0.1 %
ESTIMATED AVERAGE GLUCOSE: 103 MG/DL
GLUCOSE SERPL-MCNC: 102 MG/DL
HBA1C MFR BLD HPLC: 5.2 %
HCT VFR BLD CALC: 41.7 %
HGB BLD-MCNC: 13.7 G/DL
IMM GRANULOCYTES NFR BLD AUTO: 0.4 %
LYMPHOCYTES # BLD AUTO: 0.56 K/UL
LYMPHOCYTES NFR BLD AUTO: 8.3 %
MAN DIFF?: NORMAL
MCHC RBC-ENTMCNC: 29.5 PG
MCHC RBC-ENTMCNC: 32.9 GM/DL
MCV RBC AUTO: 89.7 FL
MONOCYTES # BLD AUTO: 0.68 K/UL
MONOCYTES NFR BLD AUTO: 10.1 %
NEUTROPHILS # BLD AUTO: 5.43 K/UL
NEUTROPHILS NFR BLD AUTO: 80.4 %
PLATELET # BLD AUTO: 381 K/UL
POTASSIUM SERPL-SCNC: 5.3 MMOL/L
PROT SERPL-MCNC: 6.7 G/DL
RBC # BLD: 4.65 M/UL
RBC # FLD: 13 %
SODIUM SERPL-SCNC: 140 MMOL/L
WBC # FLD AUTO: 6.76 K/UL

## 2019-04-26 NOTE — DISCHARGE NOTE ADULT - NS MD DC FALL RISK RISK
[facility]  Hematology/Oncology  Consult Note    Patient Name: Nazanin Malone  MRN: [id number]  Admission Date: 4/25/2019  Hospital Length of Stay: 1 days  Code Status: Full Code   Attending Provider: Cortney Arellano MD  Consulting Provider: Ralph Rodriguez MD  Primary Care Physician: Balta Forbes MD  Principal Problem:Sickle cell disease, type S beta-zero thalassemia, with acute chest syndrome    Inpatient consult to Hematology  Consult performed by: Ralph Rodriguez MD  Consult ordered by: Raymond Aparicio MD        Subjective:     HPI:  42 year old woman with sickle cell and beta thalassemia (?trait) is admitted for sickle cell pain episode.  She reports arthralgias in her shoulders, back, hips, and pain in her left leg; all of which are consistent with prior pain episodes.  Associated symptoms include fever with Tmax of 103, shortness of breath, and cough.  She did not develop a cough until day of admission, which she describes as yellow-green phlegm.  At baseline she is not on chronic pain medication and has had intolerance to hydroxyurea (full body rash at low dose).  She has not been able to find a pharmacy to fill her L-glutamine prescription.      Oncology Treatment Plan:   [No treatment plan]    Medications:  Continuous Infusions:   dextrose 5 % and 0.45 % NaCl 125 mL/hr at 04/26/19 0435     Scheduled Meds:   acetaminophen  1,000 mg Oral Q6H    albuterol-ipratropium  3 mL Nebulization Q4H WAKE    amLODIPine  10 mg Oral Daily    azithromycin  500 mg Intravenous Q24H    carBAMazepine  800 mg Oral BID    cefTRIAXone (ROCEPHIN) IVPB  2 g Intravenous Q24H    enoxparin  40 mg Subcutaneous Q12H    FLUoxetine  40 mg Oral Daily    gabapentin  800 mg Oral BID    polyethylene glycol  17 g Oral Daily    predniSONE  40 mg Oral Daily    senna-docusate 8.6-50 mg  1 tablet Oral BID     PRN Meds:albuterol sulfate, dextrose 50%, dextrose 50%, diphenhydrAMINE, glucagon (human recombinant),  For information on Fall & Injury Prevention, visit [email or web address] glucose, glucose, HYDROmorphone, naloxone, oxyCODONE, sodium chloride 0.9%     Review of patient's allergies indicates:  No Known Allergies     Past Medical History:   Diagnosis Date    Abnormal Pap smear of cervix     colposcopy    Acute chest syndrome due to hemoglobin S disease 2017    Asthma     Depression     Hypertension     Morbid obesity     Opioid dependence 2017    Pneumonia due to Streptococcus pneumoniae 2017    Sepsis due to Streptococcus pneumoniae 2017    Sickle cell-beta thalassemia disease with pain     Trigeminal neuralgia      Past Surgical History:   Procedure Laterality Date     SECTION      TONSILLECTOMY      TUBAL LIGATION       Family History     Problem Relation (Age of Onset)    Diabetes Mother    Heart disease Mother, Father        Tobacco Use    Smoking status: Never Smoker    Smokeless tobacco: Never Used   Substance and Sexual Activity    Alcohol use: No    Drug use: No    Sexual activity: Not Currently     Birth control/protection: See Surgical Hx       Review of Systems   Constitutional: Positive for fever. Negative for chills, diaphoresis, fatigue and unexpected weight change.   HENT: Negative for mouth sores and nosebleeds.    Eyes: Negative for visual disturbance.   Respiratory: Positive for cough and shortness of breath.    Cardiovascular: Negative for chest pain and leg swelling.   Gastrointestinal: Negative for abdominal distention, abdominal pain, constipation, diarrhea and nausea.   Genitourinary: Negative for vaginal bleeding.   Musculoskeletal: Positive for arthralgias and back pain.   Skin: Negative for color change.   Neurological: Negative for dizziness, weakness and light-headedness.   Hematological: Negative for adenopathy.   Psychiatric/Behavioral: Negative for confusion.     Objective:     Vital Signs (Most Recent):  Temp: 98.9 °F (37.2 °C) (19 1332)  Pulse: 105 (19 1332)  Resp: 18 (19 1332)  BP:  131/69 (04/26/19 1332)  SpO2: 98 % (04/26/19 1332) Vital Signs (24h Range):  Temp:  [98.9 °F (37.2 °C)-102.8 °F (39.3 °C)] 98.9 °F (37.2 °C)  Pulse:  [] 105  Resp:  [16-21] 18  SpO2:  [94 %-100 %] 98 %  BP: (131-185)/(69-96) 131/69     Weight: 136.1 kg (300 lb)  Body mass index is 54.87 kg/m².  Body surface area is 2.44 meters squared.      Intake/Output Summary (Last 24 hours) at 4/26/2019 1442  Last data filed at 4/26/2019 0434  Gross per 24 hour   Intake 1948.33 ml   Output --   Net 1948.33 ml       Physical Exam   Constitutional: She appears well-developed.   HENT:   Head: Normocephalic.   Eyes: Pupils are equal, round, and reactive to light. EOM are normal. No scleral icterus.   Neck: Normal range of motion. No tracheal deviation present.   Cardiovascular: Normal rate, regular rhythm and normal heart sounds. Exam reveals no gallop and no friction rub.   No murmur heard.  Pulmonary/Chest: Effort normal and breath sounds normal. No respiratory distress. She has no wheezes. She has no rales.   2LNC   Abdominal: Soft. Bowel sounds are normal. She exhibits no distension and no mass. There is no tenderness. There is no guarding.   Musculoskeletal: Normal range of motion. She exhibits no edema.   Lymphadenopathy:     She has no cervical adenopathy.   Neurological: She is alert.   Skin: Skin is warm and dry.   Psychiatric: She has a normal mood and affect.       Significant Labs:   CBC:   Recent Labs   Lab 04/25/19 1845 04/26/19  0342   WBC 8.25 7.90   HGB 11.8* 10.4*   HCT 35.8* 32.5*    189    and CMP:   Recent Labs   Lab 04/25/19  1845 04/26/19  0342    137   K 3.4* 3.3*    105   CO2 25 24    116*   BUN 6 6   CREATININE 0.8 0.8   CALCIUM 9.8 8.8   PROT 8.5* 7.3   ALBUMIN 4.0 3.5   BILITOT 0.6 0.5   ALKPHOS 105 86   AST 17 18   ALT 10 9*   ANIONGAP 11 8   EGFRNONAA >60.0 >60.0       Diagnostic Results:  I have reviewed all pertinent imaging results/findings within the past 24 hours.      4/26/19 cxr  FINDINGS:  Cardiac monitoring leads overlie the chest.  Cardiomediastinal silhouette appears stable from prior exam.  There are coarse bilateral interstitial markings, similar to prior examination.  No new large confluent airspace consolidation.  There is stable subsegmental atelectasis or scarring within the left lower lung zone.  No evidence of large pleural effusion or pneumothorax.  The visualized osseous structures appear intact.      As above.    Assessment/Plan:     * Sickle cell disease, type S beta-zero thalassemia, with acute chest syndrome  Sickle cell disease with beta thalassemia and history of acute chest.  Currently not on hydroxyurea or L-glutamine.  Reviewed CXR and without significant change compared to 3/2019 cxr.  Stable on 2LNC.  Physical exam unremarkable and hemoglobin at baseline.  -please check hemoglobin S quant  -IV fluids and pain control for her pain episode  -re-check EKG for QTc 596ms; ideally this would be done when her tachycardia has resolved.    --if QTc normalizes, ok to write for prn anti-nausea medications.        Thank you for your consult. I will sign off. Please contact us if you have any additional questions.    Ralph Rodriguez MD  Hematology/Oncology  [facility]

## 2019-04-30 LAB
METHYLMALONATE SERPL-SCNC: 151 NMOL/L
VIT B12 SERPL-MCNC: 666 PG/ML

## 2019-06-11 ENCOUNTER — RX RENEWAL (OUTPATIENT)
Age: 64
End: 2019-06-11

## 2019-07-29 ENCOUNTER — MEDICATION RENEWAL (OUTPATIENT)
Age: 64
End: 2019-07-29

## 2019-09-05 ENCOUNTER — APPOINTMENT (OUTPATIENT)
Dept: RADIATION ONCOLOGY | Facility: CLINIC | Age: 64
End: 2019-09-05

## 2019-10-15 ENCOUNTER — RX RENEWAL (OUTPATIENT)
Age: 64
End: 2019-10-15

## 2020-01-08 NOTE — PROGRESS NOTE ADULT - ATTENDING COMMENTS
The patient is medically stable, medically optimized and has no medical contraindication to surgery as required. Exam time 70 minutes including > than 50 % for bedside discussion and counseling. no

## 2020-01-10 ENCOUNTER — RX RENEWAL (OUTPATIENT)
Age: 65
End: 2020-01-10

## 2020-07-10 ENCOUNTER — RX RENEWAL (OUTPATIENT)
Age: 65
End: 2020-07-10

## 2020-07-10 RX ORDER — GABAPENTIN 300 MG/1
300 CAPSULE ORAL
Qty: 180 | Refills: 0 | Status: ACTIVE | COMMUNITY
Start: 2018-01-24 | End: 1900-01-01

## 2021-03-29 NOTE — PATIENT PROFILE ADULT. - BLOOD AVOIDANCE/RESTRICTIONS, PROFILE
03/29/21 0954   C-SSRS (Frequent Screen)   2. Have you actually had any thoughts of killing yourself? No   6. Have you done anything, started to do anything, or prepared to do anything to end your life? No   Nursing Suicide Assessment Note - Inpatient    Current assessment:Pt denies SI this shift    Current C-SSRS score: (P) Negative screen= no ideation, behaviors or history      Protective Factors / Reason for Living: Social supports    Interventions:   · q 15 min safety checks    Other Interventions Implemented:  Visual inspection of patient's environment completed. Items removed: q 15 min safety check   none

## 2021-06-22 NOTE — DISCHARGE NOTE ADULT - NSCORESITESY/N_GEN_A_CORE_RD
ASSESSMENT:  1. Counseling about travel  Patient with upcoming travel to [place], she will be in low risk areas.  - Typhoid, Inactive, Inj  - ciprofloxacin HCl (CIPRO) 500 MG tablet; Take one tablet twice daily for 3 days.  Dispense: 6 tablet; Refill: 0        PLAN:  1.  Typhoid vaccine.  2.  Ciprofloxacin 500 mg twice daily times 3 days, to take only in the event that she has potential bacterial intestinal infection with fever abdominal pain bloody diarrhea or similar symptoms.  3.  Patient is not at risk for either malaria or yellow fever.       Orders Placed This Encounter   Procedures     Typhoid, Inactive, Inj     There are no discontinued medications.    No Follow-up on file.    CHIEF COMPLAINT:  Chief Complaint   Patient presents with     Travel Consult     Leaving for [place] on 12/30/18        SUBJECTIVE:  Iesha is a 72 y.o. female who presents for travel consult. Patient states that she is traveling to [place] and will be staying in [place]. She will be making a [place] trip out [place] area [place]. She is wondering if she needs any shots or medications.       REVIEW OF SYSTEMS:   All other systems are negative.    PFSH:  Immunization History   Administered Date(s) Administered     DT (pediatric) 02/07/2006     Hep A, Adult IM (19yr & older) 08/26/2010, 08/11/2011     Hep A, historic 08/26/2010, 08/11/2011     Influenza high dose, seasonal 10/17/2017, 09/19/2018     Pneumo Conj 13-V (2010&after) 03/28/2017     Td, Adult, Absorbed 05/20/2009     Td,adult,historic,unspecified 05/20/2009     Typhoid, Inj, Inactive 12/12/2018     Social History     Socioeconomic History     Marital status:      Spouse name: Not on file     Number of children: 3     Years of education: Not on file     Highest education level: Not on file   Social Needs     Financial resource strain: Not on file     Food insecurity - worry: Not on file     Food insecurity - inability: Not on file     Transportation needs -  medical: Not on file     Transportation needs - non-medical: Not on file   Occupational History     Occupation:      Employer: RETIRED   Tobacco Use     Smoking status: Never Smoker     Smokeless tobacco: Never Used   Substance and Sexual Activity     Alcohol use: No     Drug use: No     Sexual activity: Not on file   Other Topics Concern     Not on file   Social History Narrative    Diet- Regular        Exercise- Now nothing, walking in past     No past medical history on file.  Family History   Problem Relation Age of Onset     Cancer Brother         testicular, bladder, kidney, skin     Diabetes type II Brother      Prostate cancer Brother      Heart disease Brother 59        heart attack      Valvular heart disease Mother      Diabetes type II Mother      Stroke Father      Diabetes Brother      Cancer Brother      Allergies Brother        MEDICATIONS:  Current Outpatient Medications   Medication Sig Dispense Refill     aspirin 81 MG EC tablet 81 mg daily.       MAGNESIUM HYDROXIDE (FOLEY MILK OF MAGNESIA ORAL) Take 1 capsule by mouth daily.       POLYETHYLENE GLYCOL 3350 (MIRALAX ORAL) Powder.       RESTASIS MULTIDOSE 0.05 % Drop        ciprofloxacin HCl (CIPRO) 500 MG tablet Take one tablet twice daily for 3 days. 6 tablet 0     metoprolol succinate (TOPROL-XL) 50 MG 24 hr tablet        No current facility-administered medications for this visit.        TOBACCO USE:  Social History     Tobacco Use   Smoking Status Never Smoker   Smokeless Tobacco Never Used       VITALS:  Vitals:    12/12/18 1037   BP: 106/68   Pulse: 99   Weight: 152 lb 14.4 oz (69.4 kg)     Wt Readings from Last 3 Encounters:   12/12/18 152 lb 14.4 oz (69.4 kg)   09/19/18 154 lb (69.9 kg)   07/25/18 152 lb (68.9 kg)       PHYSICAL EXAM:  Constitutional:   Reveals a female who appears overall healthy.  Vitals: per nursing notes.  Musculoskeletal: No peripheral swelling.  Neuro:  Alert and oriented. Cranial nerves, motor, sensory  exams are intact.  No gross focal deficits.  Psychiatric:  Memory intact, mood appropriate.    QUALITY MEASURES:      DATA REVIEWED:  Additional History from Old Records Summarized (2):   Decision to Obtain Records (1):   Radiology Tests Summarized or Ordered (1):   Labs Reviewed or Ordered (1):   Medicine Test Summarized or Ordered (1):   Independent Review of EKG, X-RAY, or RAPID STREP (2 each):     The visit lasted a total of 12 minutes face to face with the patient. Over 50% of the time was spent counseling and educating the patient about her travel.     By signing my name below, I, Aureliano Black, attest that this documentation has been prepared under the direction and in the presence of Dr. Jamshid Lala.  Electronic Signature: Rick Benites. 12/12/2018 10:57 AM.    I, Dr. Lala, personally performed the services described in this documentation. All medical record entries made by the scribe were at my direction and in my presence. I have reviewed the chart and discharge instructions (if applicable) and agree that the record reflects my personal performance and is accurate and complete.      Total data points: 0           No

## 2022-06-27 NOTE — OCCUPATIONAL THERAPY INITIAL EVALUATION ADULT - VISUAL ACUITY
[id number] [facility] Otolaryngology  Dr. Jodie Espinoza. Liban Melgar. Ms.Ed        Patient Name:  Olvin Jackson  :  1951     CHIEF C/O:    Chief Complaint   Patient presents with    Hearing Problem     episodes are more frequently - last one was last week on thursday       HISTORY OBTAINED FROM:  patient    HISTORY OF PRESENT ILLNESS:       Otf Angel is a 80y.o. year old female, here today for follow up for known history of Ménière's disease. Patient states right-sided hearing loss is stable however having increasing episodes of room spinning dizziness. Most recent episode was this week. No current complaints of active room spinning vertigo, no complaints of associated nausea or vomiting. No complaints of difficulty swallowing hoarseness fever chills shortness of breath or visual disturbances.     Past Medical History:   Diagnosis Date    Hyperlipidemia     Meniere's disease     Scoliosis     Thoracic outlet syndrome     Thyroid disease     Ulcerative colitis ([facility] [address]      Past Surgical History:   Procedure Laterality Date    COLONOSCOPY      has had several    COLONOSCOPY  12/17/15    bx done, diverticulosis    COLONOSCOPY  2017    multiple biopsy    COLONOSCOPY N/A 2020    COLONOSCOPY WITH BIOPSY performed by Justus Acevedo MD at [address] [facility] COLONOSCOPY N/A 2022    COLONOSCOPY WITH BIOPSY performed by Justus Acevedo MD at [address] [facility]    umbilical    SKIN CANCER EXCISION      nose   [address] [facility]       Current Outpatient Medications:     Cetirizine HCl (ZYRTEC PO), Take by mouth daily as needed, Disp: , Rfl:     estradiol (ESTRACE) 0.1 MG/GM vaginal cream, Place 2 g vaginally daily, Disp: , Rfl:     Coenzyme Q10 (COQ10) 100 MG CAPS, Take by mouth, Disp: , Rfl:     Red Yeast Rice Extract (RED YEAST RICE PO), Take 2 tablets by mouth daily, Disp: , Rfl:     Multiple Vitamins-Minerals (MULTIVITAMIN ADULTS 50+) TABS, Take 1 tablet by mouth daily, Disp: , Rfl:     thyroid (ARMOUR) 60 MG tablet, Take 60 mg by mouth daily, Disp: , Rfl:     mesalamine (LIALDA) 1.2 G EC tablet, Take 1,200 mg by mouth daily (with breakfast), Disp: , Rfl:     Probiotic Product (PROBIOTIC DAILY PO), Take by mouth, Disp: , Rfl:     folic acid (FOLVITE) 1 MG tablet, Take 1 mg by mouth daily. , Disp: , Rfl:     Calcium Carbonate-Vitamin D (CALCIUM-VITAMIN D) 500-200 MG-UNIT per tablet, Take 1 tablet by mouth daily , Disp: , Rfl:     Omega-3 Fatty Acids (OMEGA 3 PO), Take  by mouth., Disp: , Rfl:   Phenergan [promethazine hcl], Risedronate, Sulfa antibiotics, Tetanus toxoids, and Demerol hcl [meperidine]  Social History     Tobacco Use    Smoking status: Former Smoker     Quit date: 2000     Years since quittin.4    Smokeless tobacco: Never Used   Vaping Use    Vaping Use: Never used   Substance Use Topics    Alcohol use: Yes     Comment: .5 beer weekly. 2 cups coffee per day    Drug use: No     Family History   Problem Relation Age of Onset    Breast Cancer Mother 28    Breast Cancer Paternal Aunt        Review of Systems   Constitutional:  Negative for chills and fever. HENT:  Negative for ear discharge and hearing loss. Respiratory:  Negative for cough and shortness of breath. Cardiovascular:  Negative for chest pain and palpitations. Gastrointestinal:  Negative for vomiting. Skin:  Negative for rash. Allergic/Immunologic: Negative for environmental allergies. Neurological:  Negative for dizziness and headaches. Hematological:  Does not bruise/bleed easily. All other systems reviewed and are negative. /69 (Site: Left Upper Arm, Position: Sitting, Cuff Size: Medium Adult)   Pulse 79   Ht 5' 3\" (1.6 m)   Wt 134 lb 6.4 oz (61 kg)   BMI 23.81 kg/m²   Physical Exam  Vitals and nursing note reviewed. Constitutional:       Appearance: She is well-developed. HENT:      Head: Normocephalic and atraumatic. Right Ear: Tympanic membrane and ear canal normal. Decreased hearing noted. There is no impacted cerumen. No foreign body. No mastoid tenderness. No PE tube. No hemotympanum. Left Ear: Tympanic membrane and ear canal normal. There is no impacted cerumen. No foreign body. No mastoid tenderness. No PE tube. Nose: No congestion or rhinorrhea. Mouth/Throat:      Lips: No lesions. Mouth: No injury or oral lesions. Eyes:      Pupils: Pupils are equal, round, and reactive to light. Neck:      Thyroid: No thyromegaly. Trachea: No tracheal deviation. Cardiovascular:      Rate and Rhythm: Normal rate. Pulmonary:      Effort: Pulmonary effort is normal. No respiratory distress. Musculoskeletal:         General: Normal range of motion. Cervical back: Normal range of motion. Lymphadenopathy:      Cervical: No cervical adenopathy. Skin:     General: Skin is warm. Findings: No erythema. Neurological:      Mental Status: She is alert. Cranial Nerves: No cranial nerve deficit. IMPRESSION/PLAN:  Patient seen and examined for suspected Ménière's disease that is complicated by increasing levels of dizziness without change in hearing loss. Recommend stabilizing with hydrochlorothiazide daily, continuing a close low salt diet, add Valium as needed for room spinning vertigo. Patient will follow-up in 6 weeks. Dr. Annabelle Del Cid.  Otolaryngology Facial Plastic Surgery  :  [address] [place] [address] Otolaryngology/Facial Plastic Surgery Residency  Associate Clinical Professor:  Ethyl Castleman, [facility] wears glasses for distance

## 2024-03-29 NOTE — OCCUPATIONAL THERAPY INITIAL EVALUATION ADULT - NAME OF CLINICIAN
Problem: Depressive Symptoms  Goal: #Depressive s/s (self-reported/observed) are recognized and monitored  3/29/2024 1139 by Hailee Glover, RN  Outcome: Monitoring/Evaluating progress  3/29/2024 1139 by Hailee Glover, RN  Outcome: Monitoring/Evaluating progress  Goal: #Verbalizes understanding of depressive symptoms management  Description: Document in Patient Education Activity  3/29/2024 1139 by Hailee Glover, RN  Outcome: Monitoring/Evaluating progress  3/29/2024 1139 by Hailee Glover, RN  Outcome: Monitoring/Evaluating progress      RIZWAN shaw

## 2024-10-08 NOTE — PHYSICAL THERAPY INITIAL EVALUATION ADULT - STANDING BALANCE: STATIC
Alert-The patient is alert, awake and responds to voice. The patient is oriented to time, place, and person. The triage nurse is able to obtain subjective information.
fair balance

## 2024-12-06 NOTE — DISCHARGE NOTE ADULT - PLAN OF CARE
Caller: Michelle Wall    Relationship: Self    Best call back number: [phone]    What is the best time to reach you: ANYTIME     Who are you requesting to speak with (clinical staff, provider,  specific staff member): CLINICAL STAFF     What was the call regarding: PT HAD A EGD 12/06 AND  WANTS PT TO FOLLOW UP WITH A OFFICE VISIT IN 10 DAYS. HUB CAN'T SCHEDULE UNTIL 12/31. PLEASE CALL AND ADVISE        Painless Ambulation; Return to Normal Activities Follow up with Dr. Beavers in 10-12 days.  Call his office on Monday to schedule your follow up appointment.  You may bear weight AS TOLERATED to your RIGHT lower extremity.  Posterior hip precautions.  Abduction pillow when in bed.  Keep your dressing on until your follow up visit with Dr. Beavers.  Diet as above. Follow up with Dr. Beavers in 10-12 days.    Call his office on Monday to schedule your follow up appointment.    You may bear weight AS TOLERATED to your RIGHT lower extremity.  Posterior hip precautions.  Abduction pillow when in bed.  Keep your dressing on until your follow up visit with Dr. Beavers.  Diet as above. Follow up with Dr. Beavers in 10-12 days.    Call his office to schedule your follow up appointment upon discharge from hospital   You may bear weight AS TOLERATED to your RIGHT lower extremity.  Posterior hip precautions.  wear Abduction pillow at all times when in bed.  Keep your dressing on until your follow up visit with Dr. Beavers.  Diet as above.

## 2025-02-06 NOTE — ED ADULT TRIAGE NOTE - MEANS OF ARRIVAL
ambulatory
Continue Regimen: Sulfa Wash and Adaplene
Detail Level: Detailed
Plan: No changes to the treatment plan at this time
Render In Strict Bullet Format?: No